# Patient Record
Sex: MALE | Race: WHITE | NOT HISPANIC OR LATINO | URBAN - METROPOLITAN AREA
[De-identification: names, ages, dates, MRNs, and addresses within clinical notes are randomized per-mention and may not be internally consistent; named-entity substitution may affect disease eponyms.]

---

## 2023-03-18 LAB
CALCIDIOL (25 OH VITAMIN D3) (NG/ML) IN SER/PLAS: 93 NG/ML
THYROTROPIN (MIU/L) IN SER/PLAS BY DETECTION LIMIT <= 0.05 MIU/L: 1.27 MIU/L (ref 0.44–3.98)
THYROXINE (T4) (UG/DL) IN SER/PLAS: 8.5 UG/DL (ref 4.5–11.1)
TRIIODOTHYRONINE (T3) (NG/DL) IN SER/PLAS: 80 NG/DL (ref 60–200)

## 2023-03-21 LAB
THYROGLOBULIN AB (IU/ML) IN SER/PLAS: <0.9 IU/ML (ref 0–4)
THYROGLOBULIN LC-MS/MS: ABNORMAL NG/ML (ref 1.3–31.8)
THYROGLOBULIN: <0.1 NG/ML (ref 1.3–31.8)

## 2023-04-05 ENCOUNTER — OFFICE (OUTPATIENT)
Dept: URBAN - METROPOLITAN AREA CLINIC 26 | Facility: CLINIC | Age: 88
End: 2023-04-05
Payer: COMMERCIAL

## 2023-04-05 VITALS
DIASTOLIC BLOOD PRESSURE: 76 MMHG | HEIGHT: 70 IN | HEART RATE: 69 BPM | SYSTOLIC BLOOD PRESSURE: 126 MMHG | WEIGHT: 185 LBS | TEMPERATURE: 98.1 F

## 2023-04-05 DIAGNOSIS — K62.5 HEMORRHAGE OF ANUS AND RECTUM: ICD-10-CM

## 2023-04-05 DIAGNOSIS — K21.9 GASTRO-ESOPHAGEAL REFLUX DISEASE WITHOUT ESOPHAGITIS: ICD-10-CM

## 2023-04-05 DIAGNOSIS — K57.90 DIVERTICULOSIS OF INTESTINE, PART UNSPECIFIED, WITHOUT PERFO: ICD-10-CM

## 2023-04-05 PROCEDURE — 99213 OFFICE O/P EST LOW 20 MIN: CPT | Performed by: NURSE PRACTITIONER

## 2023-04-05 RX ORDER — PANTOPRAZOLE 40 MG/1
TABLET, DELAYED RELEASE ORAL
Qty: 30 | Refills: 1 | Status: ACTIVE

## 2023-04-06 LAB
ALANINE AMINOTRANSFERASE (SGPT) (U/L) IN SER/PLAS: 21 U/L (ref 10–52)
ALBUMIN (G/DL) IN SER/PLAS: 4 G/DL (ref 3.4–5)
ALKALINE PHOSPHATASE (U/L) IN SER/PLAS: 63 U/L (ref 33–136)
ANION GAP IN SER/PLAS: 12 MMOL/L (ref 10–20)
ASPARTATE AMINOTRANSFERASE (SGOT) (U/L) IN SER/PLAS: 26 U/L (ref 9–39)
BASOPHILS (10*3/UL) IN BLOOD BY AUTOMATED COUNT: 0.04 X10E9/L (ref 0–0.1)
BASOPHILS/100 LEUKOCYTES IN BLOOD BY AUTOMATED COUNT: 0.9 % (ref 0–2)
BILIRUBIN TOTAL (MG/DL) IN SER/PLAS: 0.5 MG/DL (ref 0–1.2)
CALCIUM (MG/DL) IN SER/PLAS: 8.7 MG/DL (ref 8.6–10.3)
CARBON DIOXIDE, TOTAL (MMOL/L) IN SER/PLAS: 27 MMOL/L (ref 21–32)
CHLORIDE (MMOL/L) IN SER/PLAS: 106 MMOL/L (ref 98–107)
COBALAMIN (VITAMIN B12) (PG/ML) IN SER/PLAS: 265 PG/ML (ref 211–911)
CREATININE (MG/DL) IN SER/PLAS: 1.17 MG/DL (ref 0.5–1.3)
EOSINOPHILS (10*3/UL) IN BLOOD BY AUTOMATED COUNT: 0.12 X10E9/L (ref 0–0.4)
EOSINOPHILS/100 LEUKOCYTES IN BLOOD BY AUTOMATED COUNT: 2.7 % (ref 0–6)
ERYTHROCYTE DISTRIBUTION WIDTH (RATIO) BY AUTOMATED COUNT: 12.5 % (ref 11.5–14.5)
ERYTHROCYTE MEAN CORPUSCULAR HEMOGLOBIN CONCENTRATION (G/DL) BY AUTOMATED: 32.3 G/DL (ref 32–36)
ERYTHROCYTE MEAN CORPUSCULAR VOLUME (FL) BY AUTOMATED COUNT: 104 FL (ref 80–100)
ERYTHROCYTES (10*6/UL) IN BLOOD BY AUTOMATED COUNT: 3.85 X10E12/L (ref 4.5–5.9)
FERRITIN (UG/LL) IN SER/PLAS: 203 UG/L (ref 20–300)
FOLATE (NG/ML) IN SER/PLAS: 21 NG/ML
GFR MALE: 60 ML/MIN/1.73M2
GLUCOSE (MG/DL) IN SER/PLAS: 154 MG/DL (ref 74–99)
HEMATOCRIT (%) IN BLOOD BY AUTOMATED COUNT: 40.2 % (ref 41–52)
HEMOGLOBIN (G/DL) IN BLOOD: 13 G/DL (ref 13.5–17.5)
IMMATURE GRANULOCYTES/100 LEUKOCYTES IN BLOOD BY AUTOMATED COUNT: 0.9 % (ref 0–0.9)
IRON (UG/DL) IN SER/PLAS: 103 UG/DL (ref 35–150)
IRON BINDING CAPACITY (UG/DL) IN SER/PLAS: 267 UG/DL (ref 240–445)
IRON SATURATION (%) IN SER/PLAS: 39 % (ref 25–45)
LEUKOCYTES (10*3/UL) IN BLOOD BY AUTOMATED COUNT: 4.4 X10E9/L (ref 4.4–11.3)
LYMPHOCYTES (10*3/UL) IN BLOOD BY AUTOMATED COUNT: 1.02 X10E9/L (ref 0.8–3)
LYMPHOCYTES/100 LEUKOCYTES IN BLOOD BY AUTOMATED COUNT: 23.3 % (ref 13–44)
MONOCYTES (10*3/UL) IN BLOOD BY AUTOMATED COUNT: 0.33 X10E9/L (ref 0.05–0.8)
MONOCYTES/100 LEUKOCYTES IN BLOOD BY AUTOMATED COUNT: 7.5 % (ref 2–10)
NEUTROPHILS (10*3/UL) IN BLOOD BY AUTOMATED COUNT: 2.83 X10E9/L (ref 1.6–5.5)
NEUTROPHILS/100 LEUKOCYTES IN BLOOD BY AUTOMATED COUNT: 64.7 % (ref 40–80)
NRBC (PER 100 WBCS) BY AUTOMATED COUNT: 0 /100 WBC (ref 0–0)
PLATELETS (10*3/UL) IN BLOOD AUTOMATED COUNT: 230 X10E9/L (ref 150–450)
POTASSIUM (MMOL/L) IN SER/PLAS: 3.8 MMOL/L (ref 3.5–5.3)
PROTEIN TOTAL: 6.7 G/DL (ref 6.4–8.2)
SODIUM (MMOL/L) IN SER/PLAS: 141 MMOL/L (ref 136–145)
UREA NITROGEN (MG/DL) IN SER/PLAS: 17 MG/DL (ref 6–23)

## 2023-04-12 LAB
THYROTROPIN (MIU/L) IN SER/PLAS BY DETECTION LIMIT <= 0.05 MIU/L: 0.33 MIU/L (ref 0.44–3.98)
THYROXINE (T4) (UG/DL) IN SER/PLAS: 10.7 UG/DL (ref 4.5–11.1)
TRIIODOTHYRONINE (T3) (NG/DL) IN SER/PLAS: 92 NG/DL (ref 60–200)

## 2023-05-09 ENCOUNTER — OFFICE (OUTPATIENT)
Dept: URBAN - METROPOLITAN AREA CLINIC 26 | Facility: CLINIC | Age: 88
End: 2023-05-09

## 2023-05-09 VITALS
SYSTOLIC BLOOD PRESSURE: 116 MMHG | WEIGHT: 185 LBS | HEIGHT: 70 IN | TEMPERATURE: 97.9 F | DIASTOLIC BLOOD PRESSURE: 65 MMHG | HEART RATE: 68 BPM

## 2023-05-09 DIAGNOSIS — D64.9 ANEMIA, UNSPECIFIED: ICD-10-CM

## 2023-05-09 DIAGNOSIS — K21.9 GASTRO-ESOPHAGEAL REFLUX DISEASE WITHOUT ESOPHAGITIS: ICD-10-CM

## 2023-05-09 DIAGNOSIS — K57.90 DIVERTICULOSIS OF INTESTINE, PART UNSPECIFIED, WITHOUT PERFO: ICD-10-CM

## 2023-05-09 PROCEDURE — 99213 OFFICE O/P EST LOW 20 MIN: CPT | Performed by: NURSE PRACTITIONER

## 2023-07-12 LAB
THYROTROPIN (MIU/L) IN SER/PLAS BY DETECTION LIMIT <= 0.05 MIU/L: 0.24 MIU/L (ref 0.44–3.98)
THYROXINE (T4) (UG/DL) IN SER/PLAS: 8.8 UG/DL (ref 4.5–11.1)
TRIIODOTHYRONINE (T3) (NG/DL) IN SER/PLAS: 99 NG/DL (ref 60–200)

## 2023-07-19 LAB — THYROGLOBULIN LC-MS/MS: <0.5 NG/ML (ref 1.3–31.8)

## 2023-08-03 ENCOUNTER — OFFICE (OUTPATIENT)
Dept: URBAN - METROPOLITAN AREA CLINIC 26 | Facility: CLINIC | Age: 88
End: 2023-08-03
Payer: COMMERCIAL

## 2023-08-03 VITALS
SYSTOLIC BLOOD PRESSURE: 135 MMHG | HEART RATE: 71 BPM | TEMPERATURE: 97.8 F | DIASTOLIC BLOOD PRESSURE: 77 MMHG | HEIGHT: 70 IN | WEIGHT: 184 LBS

## 2023-08-03 DIAGNOSIS — K21.9 GASTRO-ESOPHAGEAL REFLUX DISEASE WITHOUT ESOPHAGITIS: ICD-10-CM

## 2023-08-03 DIAGNOSIS — K62.5 HEMORRHAGE OF ANUS AND RECTUM: ICD-10-CM

## 2023-08-03 PROCEDURE — 99213 OFFICE O/P EST LOW 20 MIN: CPT | Performed by: INTERNAL MEDICINE

## 2023-11-03 ENCOUNTER — LAB (OUTPATIENT)
Dept: LAB | Facility: LAB | Age: 88
End: 2023-11-03
Payer: MEDICARE

## 2023-11-03 DIAGNOSIS — E78.5 HYPERLIPIDEMIA, UNSPECIFIED: Primary | ICD-10-CM

## 2023-11-03 DIAGNOSIS — R73.9 HYPERGLYCEMIA, UNSPECIFIED: ICD-10-CM

## 2023-11-03 DIAGNOSIS — E55.9 VITAMIN D DEFICIENCY, UNSPECIFIED: ICD-10-CM

## 2023-11-03 DIAGNOSIS — E03.9 HYPOTHYROIDISM, UNSPECIFIED: ICD-10-CM

## 2023-11-03 LAB
25(OH)D3 SERPL-MCNC: 107 NG/ML (ref 30–100)
ANION GAP SERPL CALC-SCNC: 10 MMOL/L (ref 10–20)
BUN SERPL-MCNC: 13 MG/DL (ref 6–23)
CALCIUM SERPL-MCNC: 8.9 MG/DL (ref 8.6–10.3)
CHLORIDE SERPL-SCNC: 103 MMOL/L (ref 98–107)
CHOLEST SERPL-MCNC: 156 MG/DL (ref 0–199)
CHOLESTEROL/HDL RATIO: 3.8
CO2 SERPL-SCNC: 28 MMOL/L (ref 21–32)
CREAT SERPL-MCNC: 1.07 MG/DL (ref 0.5–1.3)
GFR SERPL CREATININE-BSD FRML MDRD: 67 ML/MIN/1.73M*2
GLUCOSE SERPL-MCNC: 92 MG/DL (ref 74–99)
HDLC SERPL-MCNC: 41.2 MG/DL
LDLC SERPL CALC-MCNC: 89 MG/DL
NON HDL CHOLESTEROL: 115 MG/DL (ref 0–149)
POTASSIUM SERPL-SCNC: 4.3 MMOL/L (ref 3.5–5.3)
SODIUM SERPL-SCNC: 137 MMOL/L (ref 136–145)
T3FREE SERPL-MCNC: 3 PG/ML (ref 2.3–4.2)
T4 FREE SERPL-MCNC: 1.16 NG/DL (ref 0.61–1.12)
TRIGL SERPL-MCNC: 131 MG/DL (ref 0–149)
TSH SERPL-ACNC: 0.33 MIU/L (ref 0.44–3.98)
VLDL: 26 MG/DL (ref 0–40)

## 2023-11-03 PROCEDURE — 80061 LIPID PANEL: CPT

## 2023-11-03 PROCEDURE — 84439 ASSAY OF FREE THYROXINE: CPT

## 2023-11-03 PROCEDURE — 84443 ASSAY THYROID STIM HORMONE: CPT

## 2023-11-03 PROCEDURE — 80048 BASIC METABOLIC PNL TOTAL CA: CPT

## 2023-11-03 PROCEDURE — 84481 FREE ASSAY (FT-3): CPT

## 2023-11-03 PROCEDURE — 82306 VITAMIN D 25 HYDROXY: CPT

## 2023-11-03 PROCEDURE — 36415 COLL VENOUS BLD VENIPUNCTURE: CPT

## 2023-12-09 ENCOUNTER — APPOINTMENT (OUTPATIENT)
Dept: RADIOLOGY | Facility: HOSPITAL | Age: 88
DRG: 087 | End: 2023-12-09
Payer: MEDICARE

## 2023-12-09 ENCOUNTER — HOSPITAL ENCOUNTER (INPATIENT)
Facility: HOSPITAL | Age: 88
LOS: 1 days | Discharge: HOME | DRG: 087 | End: 2023-12-10
Attending: STUDENT IN AN ORGANIZED HEALTH CARE EDUCATION/TRAINING PROGRAM | Admitting: SURGERY
Payer: MEDICARE
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
## 2023-12-09 DIAGNOSIS — S06.6X0A SUBARACHNOID HEMORRHAGE FOLLOWING INJURY, NO LOSS OF CONSCIOUSNESS, INITIAL ENCOUNTER (MULTI): ICD-10-CM

## 2023-12-09 PROBLEM — C73 THYROID CANCER (MULTI): Status: RESOLVED | Noted: 2023-12-09 | Resolved: 2023-12-09

## 2023-12-09 PROBLEM — E78.00 HYPERCHOLESTEREMIA: Status: ACTIVE | Noted: 2023-12-09

## 2023-12-09 PROBLEM — M54.12 CERVICAL RADICULITIS: Status: RESOLVED | Noted: 2023-12-09 | Resolved: 2023-12-09

## 2023-12-09 PROBLEM — B02.9 SHINGLES: Status: RESOLVED | Noted: 2023-12-09 | Resolved: 2023-12-09

## 2023-12-09 PROBLEM — M54.2 CHRONIC NECK PAIN: Status: RESOLVED | Noted: 2023-12-09 | Resolved: 2023-12-09

## 2023-12-09 PROBLEM — G89.29 CHRONIC NECK PAIN: Status: RESOLVED | Noted: 2023-12-09 | Resolved: 2023-12-09

## 2023-12-09 LAB
ABO GROUP (TYPE) IN BLOOD: NORMAL
ALBUMIN SERPL BCP-MCNC: 3.5 G/DL (ref 3.4–5)
ALP SERPL-CCNC: 65 U/L (ref 33–136)
ALT SERPL W P-5'-P-CCNC: 18 U/L (ref 10–52)
ANION GAP SERPL CALC-SCNC: 12 MMOL/L (ref 10–20)
ANTIBODY SCREEN: NORMAL
APTT PPP: 30 SECONDS (ref 27–38)
AST SERPL W P-5'-P-CCNC: 22 U/L (ref 9–39)
BASOPHILS # BLD AUTO: 0.03 X10*3/UL (ref 0–0.1)
BASOPHILS NFR BLD AUTO: 0.4 %
BILIRUB SERPL-MCNC: 0.5 MG/DL (ref 0–1.2)
BUN SERPL-MCNC: 17 MG/DL (ref 6–23)
CALCIUM SERPL-MCNC: 8.9 MG/DL (ref 8.6–10.3)
CHLORIDE SERPL-SCNC: 106 MMOL/L (ref 98–107)
CO2 SERPL-SCNC: 25 MMOL/L (ref 21–32)
CREAT SERPL-MCNC: 1.12 MG/DL (ref 0.5–1.3)
EOSINOPHIL # BLD AUTO: 0.1 X10*3/UL (ref 0–0.4)
EOSINOPHIL NFR BLD AUTO: 1.4 %
ERYTHROCYTE [DISTWIDTH] IN BLOOD BY AUTOMATED COUNT: 12 % (ref 11.5–14.5)
GFR SERPL CREATININE-BSD FRML MDRD: 64 ML/MIN/1.73M*2
GLUCOSE SERPL-MCNC: 94 MG/DL (ref 74–99)
HCT VFR BLD AUTO: 41.1 % (ref 41–52)
HGB BLD-MCNC: 13.8 G/DL (ref 13.5–17.5)
IMM GRANULOCYTES # BLD AUTO: 0.06 X10*3/UL (ref 0–0.5)
IMM GRANULOCYTES NFR BLD AUTO: 0.8 % (ref 0–0.9)
INR PPP: 1.1 (ref 0.9–1.1)
LYMPHOCYTES # BLD AUTO: 0.6 X10*3/UL (ref 0.8–3)
LYMPHOCYTES NFR BLD AUTO: 8.5 %
MCH RBC QN AUTO: 33.7 PG (ref 26–34)
MCHC RBC AUTO-ENTMCNC: 33.6 G/DL (ref 32–36)
MCV RBC AUTO: 101 FL (ref 80–100)
MONOCYTES # BLD AUTO: 0.51 X10*3/UL (ref 0.05–0.8)
MONOCYTES NFR BLD AUTO: 7.2 %
NEUTROPHILS # BLD AUTO: 5.76 X10*3/UL (ref 1.6–5.5)
NEUTROPHILS NFR BLD AUTO: 81.7 %
NRBC BLD-RTO: 0 /100 WBCS (ref 0–0)
PLATELET # BLD AUTO: 192 X10*3/UL (ref 150–450)
POTASSIUM SERPL-SCNC: 4.3 MMOL/L (ref 3.5–5.3)
PROT SERPL-MCNC: 5.9 G/DL (ref 6.4–8.2)
PROTHROMBIN TIME: 12 SECONDS (ref 9.8–12.8)
RBC # BLD AUTO: 4.09 X10*6/UL (ref 4.5–5.9)
RH FACTOR (ANTIGEN D): NORMAL
SODIUM SERPL-SCNC: 139 MMOL/L (ref 136–145)
WBC # BLD AUTO: 7.1 X10*3/UL (ref 4.4–11.3)

## 2023-12-09 PROCEDURE — 73130 X-RAY EXAM OF HAND: CPT | Mod: RT

## 2023-12-09 PROCEDURE — 99223 1ST HOSP IP/OBS HIGH 75: CPT

## 2023-12-09 PROCEDURE — 70450 CT HEAD/BRAIN W/O DYE: CPT | Performed by: RADIOLOGY

## 2023-12-09 PROCEDURE — 72125 CT NECK SPINE W/O DYE: CPT | Performed by: RADIOLOGY

## 2023-12-09 PROCEDURE — 86850 RBC ANTIBODY SCREEN: CPT | Performed by: STUDENT IN AN ORGANIZED HEALTH CARE EDUCATION/TRAINING PROGRAM

## 2023-12-09 PROCEDURE — 71045 X-RAY EXAM CHEST 1 VIEW: CPT | Mod: FY

## 2023-12-09 PROCEDURE — 70450 CT HEAD/BRAIN W/O DYE: CPT

## 2023-12-09 PROCEDURE — G0378 HOSPITAL OBSERVATION PER HR: HCPCS

## 2023-12-09 PROCEDURE — 80053 COMPREHEN METABOLIC PANEL: CPT | Performed by: STUDENT IN AN ORGANIZED HEALTH CARE EDUCATION/TRAINING PROGRAM

## 2023-12-09 PROCEDURE — 73130 X-RAY EXAM OF HAND: CPT | Mod: RIGHT SIDE | Performed by: STUDENT IN AN ORGANIZED HEALTH CARE EDUCATION/TRAINING PROGRAM

## 2023-12-09 PROCEDURE — 73110 X-RAY EXAM OF WRIST: CPT | Mod: RT

## 2023-12-09 PROCEDURE — G0390 TRAUMA RESPONS W/HOSP CRITI: HCPCS

## 2023-12-09 PROCEDURE — 94760 N-INVAS EAR/PLS OXIMETRY 1: CPT

## 2023-12-09 PROCEDURE — 36415 COLL VENOUS BLD VENIPUNCTURE: CPT | Performed by: STUDENT IN AN ORGANIZED HEALTH CARE EDUCATION/TRAINING PROGRAM

## 2023-12-09 PROCEDURE — 73110 X-RAY EXAM OF WRIST: CPT | Mod: RIGHT SIDE | Performed by: STUDENT IN AN ORGANIZED HEALTH CARE EDUCATION/TRAINING PROGRAM

## 2023-12-09 PROCEDURE — 85610 PROTHROMBIN TIME: CPT | Performed by: STUDENT IN AN ORGANIZED HEALTH CARE EDUCATION/TRAINING PROGRAM

## 2023-12-09 PROCEDURE — 90715 TDAP VACCINE 7 YRS/> IM: CPT | Performed by: STUDENT IN AN ORGANIZED HEALTH CARE EDUCATION/TRAINING PROGRAM

## 2023-12-09 PROCEDURE — 85025 COMPLETE CBC W/AUTO DIFF WBC: CPT | Performed by: STUDENT IN AN ORGANIZED HEALTH CARE EDUCATION/TRAINING PROGRAM

## 2023-12-09 PROCEDURE — 71045 X-RAY EXAM CHEST 1 VIEW: CPT | Performed by: RADIOLOGY

## 2023-12-09 PROCEDURE — 99285 EMERGENCY DEPT VISIT HI MDM: CPT | Performed by: STUDENT IN AN ORGANIZED HEALTH CARE EDUCATION/TRAINING PROGRAM

## 2023-12-09 PROCEDURE — 2500000004 HC RX 250 GENERAL PHARMACY W/ HCPCS (ALT 636 FOR OP/ED)

## 2023-12-09 PROCEDURE — 90471 IMMUNIZATION ADMIN: CPT | Performed by: STUDENT IN AN ORGANIZED HEALTH CARE EDUCATION/TRAINING PROGRAM

## 2023-12-09 PROCEDURE — 2500000004 HC RX 250 GENERAL PHARMACY W/ HCPCS (ALT 636 FOR OP/ED): Performed by: STUDENT IN AN ORGANIZED HEALTH CARE EDUCATION/TRAINING PROGRAM

## 2023-12-09 PROCEDURE — 72170 X-RAY EXAM OF PELVIS: CPT

## 2023-12-09 PROCEDURE — 86901 BLOOD TYPING SEROLOGIC RH(D): CPT | Performed by: STUDENT IN AN ORGANIZED HEALTH CARE EDUCATION/TRAINING PROGRAM

## 2023-12-09 PROCEDURE — 72170 X-RAY EXAM OF PELVIS: CPT | Performed by: RADIOLOGY

## 2023-12-09 PROCEDURE — 72125 CT NECK SPINE W/O DYE: CPT

## 2023-12-09 PROCEDURE — 2500000001 HC RX 250 WO HCPCS SELF ADMINISTERED DRUGS (ALT 637 FOR MEDICARE OP): Performed by: STUDENT IN AN ORGANIZED HEALTH CARE EDUCATION/TRAINING PROGRAM

## 2023-12-09 RX ORDER — EZETIMIBE 10 MG/1
10 TABLET ORAL DAILY
COMMUNITY

## 2023-12-09 RX ORDER — LEVOTHYROXINE SODIUM 150 UG/1
150 TABLET ORAL
Status: DISCONTINUED | OUTPATIENT
Start: 2023-12-11 | End: 2023-12-10 | Stop reason: HOSPADM

## 2023-12-09 RX ORDER — LEVETIRACETAM 500 MG/1
500 TABLET ORAL EVERY 12 HOURS
Status: DISCONTINUED | OUTPATIENT
Start: 2023-12-09 | End: 2023-12-10 | Stop reason: HOSPADM

## 2023-12-09 RX ORDER — SODIUM CHLORIDE 9 MG/ML
100 INJECTION, SOLUTION INTRAVENOUS CONTINUOUS
Status: DISCONTINUED | OUTPATIENT
Start: 2023-12-09 | End: 2023-12-10 | Stop reason: HOSPADM

## 2023-12-09 RX ORDER — ASCORBIC ACID 500 MG
500 TABLET ORAL DAILY
COMMUNITY

## 2023-12-09 RX ORDER — CHOLECALCIFEROL (VITAMIN D3) 50 MCG
2000 TABLET ORAL DAILY
COMMUNITY
Start: 2018-02-14

## 2023-12-09 RX ORDER — LEVETIRACETAM 500 MG/1
500 TABLET ORAL ONCE
Status: COMPLETED | OUTPATIENT
Start: 2023-12-09 | End: 2023-12-09

## 2023-12-09 RX ORDER — EZETIMIBE 10 MG/1
10 TABLET ORAL DAILY
Status: DISCONTINUED | OUTPATIENT
Start: 2023-12-10 | End: 2023-12-10 | Stop reason: HOSPADM

## 2023-12-09 RX ORDER — LABETALOL HYDROCHLORIDE 5 MG/ML
10 INJECTION, SOLUTION INTRAVENOUS EVERY 6 HOURS PRN
Status: DISCONTINUED | OUTPATIENT
Start: 2023-12-09 | End: 2023-12-10 | Stop reason: HOSPADM

## 2023-12-09 RX ORDER — LEVOTHYROXINE SODIUM 75 UG/1
225 TABLET ORAL
Status: DISCONTINUED | OUTPATIENT
Start: 2023-12-10 | End: 2023-12-10 | Stop reason: HOSPADM

## 2023-12-09 RX ORDER — LEVETIRACETAM 5 MG/ML
500 INJECTION INTRAVASCULAR EVERY 12 HOURS
Status: DISCONTINUED | OUTPATIENT
Start: 2023-12-09 | End: 2023-12-09

## 2023-12-09 RX ORDER — FINASTERIDE 5 MG/1
5 TABLET, FILM COATED ORAL DAILY
Status: DISCONTINUED | OUTPATIENT
Start: 2023-12-10 | End: 2023-12-10 | Stop reason: HOSPADM

## 2023-12-09 RX ORDER — LEVOTHYROXINE SODIUM 150 UG/1
1 TABLET ORAL
COMMUNITY
Start: 2017-08-30

## 2023-12-09 RX ORDER — GLUCOSAM/CHONDRO/HERB 149/HYAL 750-100 MG
1 TABLET ORAL EVERY OTHER DAY
COMMUNITY

## 2023-12-09 RX ORDER — PANTOPRAZOLE SODIUM 40 MG/1
40 TABLET, DELAYED RELEASE ORAL DAILY
Status: DISCONTINUED | OUTPATIENT
Start: 2023-12-10 | End: 2023-12-10 | Stop reason: HOSPADM

## 2023-12-09 RX ORDER — MULTIVITAMIN
1 TABLET ORAL NIGHTLY
COMMUNITY

## 2023-12-09 RX ORDER — MULTIVITAMIN
1 TABLET ORAL DAILY
COMMUNITY

## 2023-12-09 RX ORDER — FINASTERIDE 5 MG/1
5 TABLET, FILM COATED ORAL DAILY
COMMUNITY
Start: 2023-09-07

## 2023-12-09 RX ORDER — HYDRALAZINE HYDROCHLORIDE 20 MG/ML
10 INJECTION INTRAMUSCULAR; INTRAVENOUS
Status: DISCONTINUED | OUTPATIENT
Start: 2023-12-09 | End: 2023-12-10 | Stop reason: HOSPADM

## 2023-12-09 RX ORDER — ACETAMINOPHEN 160 MG/5ML
650 SOLUTION ORAL EVERY 4 HOURS PRN
Status: DISCONTINUED | OUTPATIENT
Start: 2023-12-09 | End: 2023-12-09

## 2023-12-09 RX ORDER — ASPIRIN 81 MG/1
81 TABLET ORAL NIGHTLY
COMMUNITY
End: 2023-12-10 | Stop reason: HOSPADM

## 2023-12-09 RX ORDER — ACETAMINOPHEN 325 MG/1
650 TABLET ORAL EVERY 4 HOURS PRN
Status: DISCONTINUED | OUTPATIENT
Start: 2023-12-09 | End: 2023-12-10 | Stop reason: HOSPADM

## 2023-12-09 RX ORDER — PANTOPRAZOLE SODIUM 40 MG/1
40 TABLET, DELAYED RELEASE ORAL
COMMUNITY

## 2023-12-09 RX ORDER — OXYCODONE HYDROCHLORIDE 5 MG/1
5 TABLET ORAL EVERY 4 HOURS PRN
Status: DISCONTINUED | OUTPATIENT
Start: 2023-12-09 | End: 2023-12-10 | Stop reason: HOSPADM

## 2023-12-09 RX ADMIN — TETANUS TOXOID, REDUCED DIPHTHERIA TOXOID AND ACELLULAR PERTUSSIS VACCINE, ADSORBED 0.5 ML: 5; 2.5; 8; 8; 2.5 SUSPENSION INTRAMUSCULAR at 14:47

## 2023-12-09 RX ADMIN — LEVETIRACETAM 500 MG: 500 TABLET, FILM COATED ORAL at 14:48

## 2023-12-09 RX ADMIN — ACETAMINOPHEN 650 MG: 325 TABLET ORAL at 18:11

## 2023-12-09 RX ADMIN — SODIUM CHLORIDE 75 ML/HR: 9 INJECTION, SOLUTION INTRAVENOUS at 18:12

## 2023-12-09 ASSESSMENT — PAIN SCALES - GENERAL
PAINLEVEL_OUTOF10: 0 - NO PAIN
PAINLEVEL_OUTOF10: 2
PAINLEVEL_OUTOF10: 5 - MODERATE PAIN
PAINLEVEL_OUTOF10: 5 - MODERATE PAIN

## 2023-12-09 ASSESSMENT — COGNITIVE AND FUNCTIONAL STATUS - GENERAL
MOVING TO AND FROM BED TO CHAIR: A LOT
DRESSING REGULAR LOWER BODY CLOTHING: A LITTLE
TURNING FROM BACK TO SIDE WHILE IN FLAT BAD: A LITTLE
DRESSING REGULAR UPPER BODY CLOTHING: A LITTLE
TOILETING: A LITTLE
STANDING UP FROM CHAIR USING ARMS: A LOT
MOVING FROM LYING ON BACK TO SITTING ON SIDE OF FLAT BED WITH BEDRAILS: A LITTLE
CLIMB 3 TO 5 STEPS WITH RAILING: A LOT
EATING MEALS: A LITTLE
PERSONAL GROOMING: A LITTLE
WALKING IN HOSPITAL ROOM: A LOT
MOBILITY SCORE: 14
HELP NEEDED FOR BATHING: A LITTLE
DAILY ACTIVITIY SCORE: 18

## 2023-12-09 ASSESSMENT — ACTIVITIES OF DAILY LIVING (ADL)
PATIENT'S MEMORY ADEQUATE TO SAFELY COMPLETE DAILY ACTIVITIES?: YES
FEEDING YOURSELF: NEEDS ASSISTANCE
BATHING: NEEDS ASSISTANCE
DRESSING YOURSELF: NEEDS ASSISTANCE
ASSISTIVE_DEVICE: EYEGLASSES
JUDGMENT_ADEQUATE_SAFELY_COMPLETE_DAILY_ACTIVITIES: YES
GROOMING: NEEDS ASSISTANCE
WALKS IN HOME: NEEDS ASSISTANCE
ADEQUATE_TO_COMPLETE_ADL: YES
HEARING - LEFT EAR: FUNCTIONAL
HEARING - RIGHT EAR: FUNCTIONAL
TOILETING: NEEDS ASSISTANCE

## 2023-12-09 ASSESSMENT — COLUMBIA-SUICIDE SEVERITY RATING SCALE - C-SSRS
6. HAVE YOU EVER DONE ANYTHING, STARTED TO DO ANYTHING, OR PREPARED TO DO ANYTHING TO END YOUR LIFE?: NO
2. HAVE YOU ACTUALLY HAD ANY THOUGHTS OF KILLING YOURSELF?: NO
1. IN THE PAST MONTH, HAVE YOU WISHED YOU WERE DEAD OR WISHED YOU COULD GO TO SLEEP AND NOT WAKE UP?: NO

## 2023-12-09 ASSESSMENT — PAIN DESCRIPTION - ORIENTATION: ORIENTATION: RIGHT

## 2023-12-09 ASSESSMENT — LIFESTYLE VARIABLES
EVER HAD A DRINK FIRST THING IN THE MORNING TO STEADY YOUR NERVES TO GET RID OF A HANGOVER: NO
HAVE YOU EVER FELT YOU SHOULD CUT DOWN ON YOUR DRINKING: NO
REASON UNABLE TO ASSESS: NO
EVER FELT BAD OR GUILTY ABOUT YOUR DRINKING: NO
HAVE PEOPLE ANNOYED YOU BY CRITICIZING YOUR DRINKING: NO

## 2023-12-09 ASSESSMENT — PAIN DESCRIPTION - LOCATION
LOCATION: SHOULDER
LOCATION: HEAD

## 2023-12-09 ASSESSMENT — PAIN - FUNCTIONAL ASSESSMENT
PAIN_FUNCTIONAL_ASSESSMENT: 0-10

## 2023-12-09 NOTE — H&P
Trauma History and Physical        Subjective   Patient is a 88 y.o. male admitted on 12/9/2023  1:30 PM  Arrival Date: 12/09/23  Activation Time: 1321  Trauma Activation Level: limited  Date of injury: today  Time of injury: immediately prior to arrival    HPI:  Pt reports he was helping set up for a [employer] party, standing on a counter a few feet off the ground when he lost his balance and fell. +Head strike, -LOC, witnessed fall. Pt reported was a bit disoriented at the scene per EMS, A&Ox2 only, but pt denies this. He denies HA, visual changes, dizziness/lightheadedness, weakness, paresthesias, or N/V. He endorses some mild Rt chest wall pain with movement that started after he arrived to the ED, but he states it just feels like a sore muscle and is not worse with breathing. He denies SOB or chest pain. Pt reports he takes a baby ASA daily.    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    Acquired cyst of kidney 02/13/2008    Benign localized hyperplasia of prostate 12/31/2003    Benign prostatic hyperplasia without lower urinary tract symptoms     Prostate enlargement    BPH with obstruction/lower urinary tract symptoms 08/22/2007    Cervical radiculitis 12/09/2023    Chronic neck pain 12/09/2023    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 s/p thyroidectomy 2009    Urinary frequency 12/31/2003     Past Surgical History:   Procedure Laterality Date    BLADDER SURGERY  12/19/2017    Bladder Surgery    OTHER SURGICAL HISTORY  12/19/2017    History Of Prior Surgery    TOTAL THYROIDECTOMY  12/19/2017    Thyroid Surgery Total Thyroidectomy     (Not in a hospital admission)    Patient has no known allergies.     No family history on file.    Objective   Vitals:  Most Recent:  Vitals:    12/09/23 1500   BP: 121/67   Pulse: 61   Resp: 15   Temp:    SpO2: 95%       24hr Min/Max:  Temp  Min: 36.4 °C (97.5 °F)  Max: 36.4 °C (97.5 °F)  Pulse  Min: 61  Max: 77  BP  Min: 118/63  Max: 158/75  Resp  Min: 15  Max: 18  SpO2  Min: 93 %  Max: 96 %    Hemodynamic parameters for last 24 hours:       No intake/output data recorded.      Physical exam:    Physical Exam  Constitutional:       General: He is not in acute distress.     Appearance: Normal appearance. He is not ill-appearing.   HENT:      Head: Laceration (Small superficial Rt frontal abrasion w/ associated hematoma, slight oozing) present. No raccoon eyes or Mohan's sign.      Jaw: There is normal jaw occlusion. No tenderness.      Right Ear: External ear normal.      Left Ear: External ear normal.      Nose: No nasal deformity, septal deviation or nasal tenderness.      Right Nostril: No epistaxis or septal hematoma.      Left Nostril: No epistaxis or septal hematoma.      Mouth/Throat:      Lips: Pink.      Mouth: Mucous membranes are moist. No injury.      Dentition: Normal dentition.   Eyes:      Extraocular Movements: Extraocular movements intact.      Conjunctiva/sclera: Conjunctivae normal.      Pupils: Pupils are equal, round, and reactive to light.      Comments: No periorbital hematoma   Neck:      Trachea: No tracheal  deviation.   Cardiovascular:      Rate and Rhythm: Normal rate and regular rhythm.      Pulses: Normal pulses.      Heart sounds: Normal heart sounds. No murmur heard.  Pulmonary:      Effort: Pulmonary effort is normal. No respiratory distress.      Breath sounds: Normal breath sounds and air entry.   Chest:      Comments: Mildly TTP Rt upper anterior chest. No hematoma, ecchymosis, abrasion, or crepitus  Abdominal:      General: Abdomen is flat. Bowel sounds are normal.      Palpations: Abdomen is soft.      Tenderness: There is no abdominal tenderness.   Genitourinary:     Comments: Pelvis stable to AP and lateral compression  Musculoskeletal:      Cervical back: Normal and normal range of motion. No deformity or bony tenderness. No pain with movement or muscular tenderness.      Thoracic back: Normal. No deformity or bony tenderness.      Lumbar back: Normal. No deformity or bony tenderness.      Comments: No UE or LE deformities, no bony tenderness, no edema or contusions, MAEx4   Skin:     General: Skin is warm and dry.      Coloration: Skin is not pale.      Comments: Superficial skin tears and ecchymoses to Rt hand and wrist, hemostatic   Neurological:      Mental Status: He is alert and oriented to person, place, and time.      GCS: GCS eye subscore is 4. GCS verbal subscore is 5. GCS motor subscore is 6.      Cranial Nerves: Cranial nerves 2-12 are intact.      Sensory: Sensation is intact.      Motor: Motor function is intact.   Psychiatric:         Attention and Perception: Attention normal.         Mood and Affect: Mood and affect normal.         Behavior: Behavior normal.         Lab/Radiology/Diagnostic Review:  Results for orders placed or performed during the hospital encounter of 12/09/23 (from the past 24 hour(s))   CBC and Auto Differential   Result Value Ref Range    WBC 7.1 4.4 - 11.3 x10*3/uL    nRBC 0.0 0.0 - 0.0 /100 WBCs    RBC 4.09 (L) 4.50 - 5.90 x10*6/uL    Hemoglobin 13.8 13.5 - 17.5  g/dL    Hematocrit 41.1 41.0 - 52.0 %     (H) 80 - 100 fL    MCH 33.7 26.0 - 34.0 pg    MCHC 33.6 32.0 - 36.0 g/dL    RDW 12.0 11.5 - 14.5 %    Platelets 192 150 - 450 x10*3/uL    Neutrophils % 81.7 40.0 - 80.0 %    Immature Granulocytes %, Automated 0.8 0.0 - 0.9 %    Lymphocytes % 8.5 13.0 - 44.0 %    Monocytes % 7.2 2.0 - 10.0 %    Eosinophils % 1.4 0.0 - 6.0 %    Basophils % 0.4 0.0 - 2.0 %    Neutrophils Absolute 5.76 (H) 1.60 - 5.50 x10*3/uL    Immature Granulocytes Absolute, Automated 0.06 0.00 - 0.50 x10*3/uL    Lymphocytes Absolute 0.60 (L) 0.80 - 3.00 x10*3/uL    Monocytes Absolute 0.51 0.05 - 0.80 x10*3/uL    Eosinophils Absolute 0.10 0.00 - 0.40 x10*3/uL    Basophils Absolute 0.03 0.00 - 0.10 x10*3/uL   Comprehensive metabolic panel   Result Value Ref Range    Glucose 94 74 - 99 mg/dL    Sodium 139 136 - 145 mmol/L    Potassium 4.3 3.5 - 5.3 mmol/L    Chloride 106 98 - 107 mmol/L    Bicarbonate 25 21 - 32 mmol/L    Anion Gap 12 10 - 20 mmol/L    Urea Nitrogen 17 6 - 23 mg/dL    Creatinine 1.12 0.50 - 1.30 mg/dL    eGFR 64 >60 mL/min/1.73m*2    Calcium 8.9 8.6 - 10.3 mg/dL    Albumin 3.5 3.4 - 5.0 g/dL    Alkaline Phosphatase 65 33 - 136 U/L    Total Protein 5.9 (L) 6.4 - 8.2 g/dL    AST 22 9 - 39 U/L    Bilirubin, Total 0.5 0.0 - 1.2 mg/dL    ALT 18 10 - 52 U/L   Coagulation Screen   Result Value Ref Range    Protime 12.0 9.8 - 12.8 seconds    INR 1.1 0.9 - 1.1    aPTT 30 27 - 38 seconds     XR hand right 3+ views    Result Date: 12/9/2023  Interpreted By:  Faustino Kirk, STUDY: XR WRIST RIGHT 3+ VIEWS; XR HAND RIGHT 3+ VIEWS; ;  12/9/2023 2:26 pm   INDICATION: Signs/Symptoms:fall.   COMPARISON: None.   ACCESSION NUMBER(S): [id number]; [id number]   ORDERING CLINICIAN: GABRIEL COLBY   FINDINGS: No fracture or dislocation. Mild degenerative changes of the wrist. No significant soft tissue swelling. No radiopaque foreign body.       No acute findings of the right hand and wrist.     MACRO: None    Signed by: Faustino Kirk 12/9/2023 2:38 PM Dictation workstation:   [id number]    XR wrist right 3+ views    Result Date: 12/9/2023  Interpreted By:  Faustino Kirk, STUDY: XR WRIST RIGHT 3+ VIEWS; XR HAND RIGHT 3+ VIEWS; ;  12/9/2023 2:26 pm   INDICATION: Signs/Symptoms:fall.   COMPARISON: None.   ACCESSION NUMBER(S): [id number]; [id number]   ORDERING CLINICIAN: GABRIEL COLBY   FINDINGS: No fracture or dislocation. Mild degenerative changes of the wrist. No significant soft tissue swelling. No radiopaque foreign body.       No acute findings of the right hand and wrist.     MACRO: None   Signed by: Faustino Kirk 12/9/2023 2:38 PM Dictation workstation:   [id number]    XR chest 1 view    Result Date: 12/9/2023  Interpreted By:  Soto Oconnell, STUDY: XR CHEST 1 VIEW;  12/9/2023 2:02 pm   INDICATION: Signs/Symptoms:Trauma.   COMPARISON: Prior chest x-rays, most recent from 08/02/2018.   ACCESSION NUMBER(S): [id number]   ORDERING CLINICIAN: GABRIEL COLBY   TECHNIQUE: Single AP portable view of the chest was obtained.   FINDINGS: MEDIASTINUM/ LUNGS/ NEWTON: No cardiomegaly, vascular congestion, or pleural effusion. Scant stable linear scarring at the extreme lung bases. No abnormal opacity in either lung worrisome for tumor or pneumonia. No pneumothorax. No tracheal deviation. No abnormal hilar fullness or gross mass on either side.   BONES: No lytic or blastic destructive bone lesion.   UPPER ABDOMEN: Grossly intact.       Scant linear scarring at the lung bases. Otherwise, negative exam.   Signed by: Soto Oconnell 12/9/2023 2:14 PM Dictation workstation:   [id number]    XR pelvis 1-2 views    Result Date: 12/9/2023  Interpreted By:  Soto Oconnell, STUDY: XR PELVIS 1-2 VIEWS;  12/9/2023 2:02 pm   INDICATION: Signs/Symptoms:fall off ladder.   COMPARISON: None.   ACCESSION NUMBER(S): [id number]   ORDERING CLINICIAN: GABRIEL COLBY   TECHNIQUE: Single AP view pelvis was obtained.   FINDINGS: Osteopenic bones.  Bilateral hip joint spaces are preserved. Mild sclerotic arthritic changes in both SI joints. Mild-to-moderate multilevel distal lumbar spine disc space narrowing with endplate osteophytosis. Multiple left-sided pelvic phleboliths. Distal aorto bi-iliac stent graft in place. No lytic or blastic destructive bone lesion. No acute fracture or dislocation. No opaque soft tissue foreign body. No periosteal reaction or erosion.       Osteopenia and DJD as described.   No acute fracture or dislocation.   If symptoms persist or otherwise indicated, could pursue CT scan or MRI.   Signed by: Soto Oconnell 12/9/2023 2:12 PM Dictation workstation:   [id number]    CT head W O contrast trauma protocol    Result Date: 12/9/2023  Interpreted By:  Soto Oconnell, STUDY: CT HEAD W/O CONTRAST TRAUMA PROTOCOL;  12/9/2023 1:53 pm   INDICATION: Signs/Symptoms:fall off ladder.   COMPARISON: None.   ACCESSION NUMBER(S): [id number]   ORDERING CLINICIAN: GABRIEL COLBY   TECHNIQUE: Routine axial images were obtained from the skull base through the vertex.  Sagittal and coronal reconstruction images were generated. Brain, subdural, and bone windows were reviewed.   FINDINGS: INTRACRANIAL: There is diffuse right-sided mild scalp edema involving right parietal, temporal, and frontal scalp. There is also a small anterolateral right frontal scalp hematoma measuring 23 x 9 mm on axial image 61/82. Mild prominence of ventricles and sulci. There is mild patchy hypodensity throughout the deep periventricular white matter. There is a small focus of acute subarachnoid blood in the high lateral right parietal lobe on axial images 24 through 29/82. This is also well demonstrated on coronal images 61 through 63/122. There is a branching hyperdensity in the posterior mid to lower left cerebellar hemisphere that actually is more likely calcification than blood density. It measures up 78 Hounsfield units of CT density. No other indication of acute intracranial  bleed. No midline shift. No destructive bone lesion. No depressed skull fracture. Skullbase arterial calcifications in the carotid siphons and vertebral arteries.   EXTRACRANIAL: Visualized paranasal sinuses were clear. Visualized mastoid air cells were clear.       Diffuse right-sided scalp edema as described. Small anterolateral right frontal scalp hematoma. No depressed skull fracture.   Small focus of acute subarachnoid blood in the high lateral right parietal lobe as described.   Branching linear hyperdensity in the left cerebellum is most likely calcification, perhaps vascular or more likely reflecting remote trauma; physiologic calcification is another possibility.   Mild volume loss.   Mild chronic white matter ischemic disease in the deep periventricular regions.   MACRO: Soto Oconnell discussed the significance and urgency of this critical finding by Epic Secure Chat with  GABRIEL COLBY on 12/9/2023 at 2:10 pm. (**-RCF-**) Findings:  See findings.   Signed by: Soto Oconnell 12/9/2023 2:11 PM Dictation workstation:   [id number]    CT cervical spine wo IV contrast    Result Date: 12/9/2023  Interpreted By:  Soto Oconnell, STUDY: CT CERVICAL SPINE WO IV CONTRAST;  12/9/2023 1:53 pm   INDICATION: Signs/Symptoms:fall off ladder.   COMPARISON: None.   ACCESSION NUMBER(S): [id number]   ORDERING CLINICIAN: GABRIEL COLBY   TECHNIQUE: Thin section axial images were obtained from the skull base down through the thoracic inlet. Sagittal and coronal reconstruction images were generated. Soft tissue, lung, and bone windows were reviewed.   FINDINGS: VERTEBRAL BODIES AND POSTERIOR ELEMENTS: Mild-to-moderate disc space narrowing with endplate spurring at C4-5 and C5-6. Straightening of normal cervical lordosis. Joint space loss with spurring and subchondral sclerosis in the anterior superior aspect of the atlantoaxial joint. Multilevel interfacet hypertrophy with spur formation. Uncovertebral hypertrophy with spur formation  from C3-4 through C6-7. No cervical spine compression fracture. No posterior element fracture. No destructive bone lesion. No listhesis.   SPINAL CANAL: No gross disc herniation.   NECK SOFT TISSUES: Thyroid gland is either absent or atrophic. Mild bilateral carotid bulb arterial calcifications. The left parotid gland is unremarkable. The right parotid gland appears to be fatty atrophied.   LUNG APICES: Imaged portion of the lung apices are within normal limits.   SKULL BASE: Please refer to CT scan head report also done today.       DJD in the cervical spine as described. No CT evidence of cervical spine fracture in this exam.   Apparent fatty atrophy of the right parotid gland. The thyroid gland is either absent or atrophic.   MACRO: None   Signed by: Soto Oconnell 12/9/2023 2:01 PM Dictation workstation:   [id number]      Assessment /Plan      Patient is a 88 y.o. male with PMH significant for HLD and thyroid cancer s/p thyroidectomy on synthroid who presented to [facility] ED on 12/9 as a limited trauma following a mechanical, witnessed fall with +head strike and -LOC.    List of Injuries:  Focal SAH without shift or neuro deficits  Rt frontal scalp hematoma and superficial laceration  Rt hand/wrist skin tears and ecchymosis  Rt chest wall pain    Assessment and Plan:    #SAH  - Repeat CT at 6 hours  - HOB >45  - Maintain SBP between 100 and 160  - BID keppra x7 days per Dr. Coto  - Minimize metabolic demands    > Avoid fevers    > Avoid hyponatremia     > Maintain euglycemia    > Avoid hypoxia  - NSGY consult  - NPO until repeat CT, okay for diet if stable  - mIVF with NS while NPO  - AM labs    Chronic conditions:  #HLD  #Hypothyroidism  - Continue home meds as appropriate    DVT ppx: no chemoprophylaxis, SCDs only    Dispo: admit to stepdown. Anticipate discharge to home tomorrow if repeat CT is stable.    Patient discussed with attending surgeon, Dr. Hook    I spent 60 minutes in the professional and  overall care of this patient.      Patricia Patel PA-C

## 2023-12-09 NOTE — PROGRESS NOTES
Examined. History reviewed with patient and family present. Chart reviewed where relevant. Discussed findings and clinical plan with note author. Full note to follow.    Fall from 4 foot vault while replacing ceiling tile in bank, following reef hanging. Witnessed. No LOC. Remembers fall. Neurointact.  Small focus of acute subarachnoid blood in the high lateral right parietal lobe. Superficial scalp hematoma on R forehead, minor weeping.  Prior thyroid cancer and surgery.  NSGY wants Keppra and repeat scan in 6 hours.  Step down or ICU for neurochecks Q2H.  Admitted to trauma. Hold chemoprophylaxis.      Ammon Hook MD, PhD  Available via [employer]

## 2023-12-09 NOTE — PROGRESS NOTES
Pharmacy Medication History Review    Bi Parikh is a 88 y.o. male admitted for Fall, initial encounter. Pharmacy reviewed the patient's [id number] medications and allergies for accuracy.    The list below reflectives the updated PTA list. Please review each medication in order reconciliation for additional clarification and justification.  Prior to Admission Medications   Prescriptions Last Dose Informant Patient Reported? Taking?   POTASSIUM GLUCONATE ORAL 12/9/2023  Yes Yes   Sig: Take 300 mg by mouth once daily.   ascorbic acid (Vitamin C) 500 mg tablet 12/9/2023  Yes Yes   Sig: Take 1 tablet (500 mg) by mouth once daily.   aspirin 81 mg EC tablet 12/8/2023  Yes Yes   Sig: Take 1 tablet (81 mg) by mouth once daily at bedtime.   calcium carbonate-vitamin D3 (Calcium with Vitamin D) 600 mg-10 mcg (400 unit) tablet 12/8/2023  Yes Yes   Sig: Take 1 tablet by mouth once daily at bedtime.   cholecalciferol (Vitamin D-3) 50 MCG (2000 UT) tablet 12/9/2023  Yes Yes   Sig: Take 1 tablet (2,000 Units) by mouth once daily.   ezetimibe (Zetia) 10 mg tablet 12/9/2023  Yes Yes   Sig: Take 1 tablet (10 mg) by mouth once daily.   finasteride (Proscar) 5 mg tablet 12/9/2023  Yes Yes   Sig: Take 1 tablet (5 mg) by mouth once daily.   levothyroxine (Synthroid, Levoxyl) 150 mcg tablet 12/9/2023  Yes Yes   Sig: Take 1 tablet (150 mcg) by mouth. Daily before breakfast 6 times weekly, and 1.5 tabs (225 mg) on Sundays   multivitamin tablet 12/9/2023  Yes Yes   Sig: Take 1 tablet by mouth once daily.   omega 3-dha-epa-fish oil (Fish OiL) 1,000 mg (120 mg-180 mg) capsule 12/9/2023  Yes Yes   Sig: Take 1 capsule (1,000 mg) by mouth every other day.   pantoprazole (ProtoNix) 40 mg EC tablet 12/9/2023  Yes Yes   Sig: Take 1 tablet (40 mg) by mouth once daily in the morning. Take before meals.      Facility-Administered Medications: None        The list below reflectives the updated allergy list. Please review each documented  allergy for additional clarification and justification.  Allergies  Reviewed by Alejandra Gomez CPhT on 12/9/2023   No Known Allergies         Below are additional concerns with the patient's PTA list.      Alejandra Gomez CPhT

## 2023-12-09 NOTE — ED PROVIDER NOTES
HPI   Chief Complaint   Patient presents with    Fall       This is a 88-year-old male with past medical history of hypertension presenting to the emergency department for a fall.  Patient was approximately 5 feet off the ground on a ladder trying to hang a wreath when he slipped and fell.  He did hit his head.  No LOC.  He is not on blood thinners.  Patient had some transient confusion for EMS where he was only oriented x 2 though this improved in route to the hospital.  Patient himself is denying any symptoms.  He currently feels at his baseline.  Denies any symptoms before or after including headaches, chest pain, shortness of breath, dizziness/lightheadedness, abdominal pain, back pain, urinary symptoms, lower extremity pain or swelling.      History provided by:  EMS personnel and patient   used: No                        [place] Coma Scale Score: 14                  Patient History   Past Medical History:   Diagnosis Date    Benign prostatic hyperplasia without lower urinary tract symptoms     Prostate enlargement    Personal history of malignant neoplasm of thyroid     History of malignant neoplasm of thyroid    Personal history of other endocrine, nutritional and metabolic disease     History of hypothyroidism    Personal history of other infectious and parasitic diseases 07/09/2020    History of herpes zoster     Past Surgical History:   Procedure Laterality Date    BLADDER SURGERY  12/19/2017    Bladder Surgery    OTHER SURGICAL HISTORY  12/19/2017    History Of Prior Surgery    TOTAL THYROIDECTOMY  12/19/2017    Thyroid Surgery Total Thyroidectomy     No family history on file.  Social History     Tobacco Use    Smoking status: Not on file    Smokeless tobacco: Not on file   Substance Use Topics    Alcohol use: Not on file    Drug use: Not on file       Physical Exam   ED Triage Vitals [12/09/23 1332]   Temp Heart Rate Resp BP   36.4 °C (97.5 °F) 77 18 137/69      SpO2 Temp Source  Heart Rate Source Patient Position   96 % Temporal -- --      BP Location FiO2 (%)     -- --       Physical Exam  GEN: well appearing, no acute distress  HEAD: Lateral/supraorbital hematoma with superficial laceration less than 1 cm, no active bleeding  EYES: EOMI, PEERL,  ENT: mmm, no rhinorrhea, uvula midline  NECK: supple, no C-spine tenderness, no stepoffs or deformities  CVS/CHEST: reg rate, nl rhythm, no murmurs/gallops/rubs  PULM: CTAB b/l no wheezes, crackles, or rhonchi   GI: NT/ND, no masses or organomegaly, soft, no guarding  BACK: no vertebral point tenderness  EXT: no LE edema, 2+ periph pulses in bilat radial, femoral and DP pulses.  Skin tear over dorsal right hand between first and second digits with no active bleeding, no deformity, no tenderness palpation specifically no snuffbox tenderness intact sensation and  strength in both extremities  NEURO: CN 2-12 grossly intact, no focal deficits, no facial asymmetry, moving all extremities, 5/5 Strength in bicep/tricep/hip flexor/plantar flexion. Sensation over these muscle groups intact. Able to complete finger to nose, rapid alternating movements without difficulty. Can ambulate without gait disturbance.  GCS 15  PSYCH: AAOx3 answers questions appropriately    ED Course & MDM   ED Course as of 12/11/23 1926   Sat Dec 09, 2023   1416 Patient with a small right parietal subarachnoid traumatic hemorrhage.  Also findings of scalp edema likely secondary to his fall.  Patient discussed with neurosurgery on-call Dr. Coto who is recommending 6-hour stability scan.  He also recommends patient being started on 500 of Keppra twice daily for 1 week. [DE]   1421 Patient discussed with trauma attending.  Patient will be admitted to their service for further monitoring. [DE]      ED Course User Index  [DE] Sae Henriquez MD         Diagnoses as of 12/11/23 1926   Fall, initial encounter   Subarachnoid hemorrhage following injury, no loss of consciousness,  initial encounter (CMS/[facility])       Medical Decision Making  This is a 88-year-old male with past medical history of hypertension presenting to the emergency department for a fall.  Patient stable upon presentation to the emergency department, no acute distress and vitals are unremarkable.  On exam patient's primary survey is intact.  He is answering questions appropriately and is currently oriented x 4.  He is moving all extremities.  He does have a small hematoma to the right lateral forehead just above the eyebrow.  He also has some skin tears to the right hand without any tenderness palpation.  No snuffbox tenderness palpation.  Patient fell to some mechanical in nature but we will obtain imaging of the head and neck given his age and signs of injury with transient confusion.  Also obtain x-rays to the hand.  Patient's tetanus was updated in the emergency department.  If negative and patient is able to ambulate I do feel he will be able to be discharged home.    Procedure  Critical Care    Performed by: Sae Henriquez MD  Authorized by: Sae Henriquez MD    Critical care provider statement:     Critical care time (minutes):  15    Critical care was necessary to treat or prevent imminent or life-threatening deterioration of the following conditions:  Trauma    Critical care was time spent personally by me on the following activities:  Discussions with consultants, development of treatment plan with patient or surrogate, examination of patient, obtaining history from patient or surrogate, ordering and review of laboratory studies, ordering and review of radiographic studies and re-evaluation of patient's condition    Care discussed with: admitting provider         Sae Henriquez MD  12/11/23 1927

## 2023-12-09 NOTE — ED TRIAGE NOTES
Pt fell of cabinet while hanging wreath. Pt fell and struck head, denies LOC, denies blood thinners. Per EMS, pt was alert and oriented x2, pt now A and O x4. Pt with small hematoma to R forehead. Pt denies any other pain. Pt unsure if tentanus is up to date. Bg of 123 per EMS.

## 2023-12-10 VITALS
WEIGHT: 182.98 LBS | OXYGEN SATURATION: 90 % | HEART RATE: 72 BPM | DIASTOLIC BLOOD PRESSURE: 102 MMHG | HEIGHT: 71 IN | TEMPERATURE: 99.5 F | BODY MASS INDEX: 25.62 KG/M2 | RESPIRATION RATE: 18 BRPM | SYSTOLIC BLOOD PRESSURE: 145 MMHG
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
## 2023-12-10 PROBLEM — S06.6X0A SUBARACHNOID HEMORRHAGE FOLLOWING INJURY, NO LOSS OF CONSCIOUSNESS (MULTI): Status: ACTIVE | Noted: 2023-12-10

## 2023-12-10 LAB
ANION GAP SERPL CALC-SCNC: 10 MMOL/L (ref 10–20)
BUN SERPL-MCNC: 15 MG/DL (ref 6–23)
CALCIUM SERPL-MCNC: 8.1 MG/DL (ref 8.6–10.3)
CHLORIDE SERPL-SCNC: 104 MMOL/L (ref 98–107)
CO2 SERPL-SCNC: 27 MMOL/L (ref 21–32)
CREAT SERPL-MCNC: 1.06 MG/DL (ref 0.5–1.3)
ERYTHROCYTE [DISTWIDTH] IN BLOOD BY AUTOMATED COUNT: 12.1 % (ref 11.5–14.5)
GFR SERPL CREATININE-BSD FRML MDRD: 68 ML/MIN/1.73M*2
GLUCOSE SERPL-MCNC: 111 MG/DL (ref 74–99)
HCT VFR BLD AUTO: 37 % (ref 41–52)
HGB BLD-MCNC: 12.2 G/DL (ref 13.5–17.5)
MCH RBC QN AUTO: 33.8 PG (ref 26–34)
MCHC RBC AUTO-ENTMCNC: 33 G/DL (ref 32–36)
MCV RBC AUTO: 103 FL (ref 80–100)
NRBC BLD-RTO: 0 /100 WBCS (ref 0–0)
PLATELET # BLD AUTO: 168 X10*3/UL (ref 150–450)
POTASSIUM SERPL-SCNC: 4.1 MMOL/L (ref 3.5–5.3)
RBC # BLD AUTO: 3.61 X10*6/UL (ref 4.5–5.9)
SODIUM SERPL-SCNC: 137 MMOL/L (ref 136–145)
WBC # BLD AUTO: 6 X10*3/UL (ref 4.4–11.3)

## 2023-12-10 PROCEDURE — 36415 COLL VENOUS BLD VENIPUNCTURE: CPT

## 2023-12-10 PROCEDURE — 85027 COMPLETE CBC AUTOMATED: CPT

## 2023-12-10 PROCEDURE — 2020000001 HC ICU ROOM DAILY

## 2023-12-10 PROCEDURE — 2500000002 HC RX 250 W HCPCS SELF ADMINISTERED DRUGS (ALT 637 FOR MEDICARE OP, ALT 636 FOR OP/ED)

## 2023-12-10 PROCEDURE — 82565 ASSAY OF CREATININE: CPT

## 2023-12-10 PROCEDURE — 2500000004 HC RX 250 GENERAL PHARMACY W/ HCPCS (ALT 636 FOR OP/ED)

## 2023-12-10 PROCEDURE — 99232 SBSQ HOSP IP/OBS MODERATE 35: CPT

## 2023-12-10 PROCEDURE — 2500000001 HC RX 250 WO HCPCS SELF ADMINISTERED DRUGS (ALT 637 FOR MEDICARE OP)

## 2023-12-10 RX ORDER — LEVETIRACETAM 500 MG/1
500 TABLET ORAL EVERY 12 HOURS
Qty: 11 TABLET | Refills: 0 | Status: SHIPPED | OUTPATIENT
Start: 2023-12-10 | End: 2024-01-24 | Stop reason: ALTCHOICE

## 2023-12-10 RX ADMIN — PANTOPRAZOLE SODIUM 40 MG: 40 TABLET, DELAYED RELEASE ORAL at 09:08

## 2023-12-10 RX ADMIN — SODIUM CHLORIDE 75 ML/HR: 9 INJECTION, SOLUTION INTRAVENOUS at 06:20

## 2023-12-10 RX ADMIN — EZETIMIBE 10 MG: 10 TABLET ORAL at 09:08

## 2023-12-10 RX ADMIN — LEVOTHYROXINE SODIUM 225 MCG: 0.07 TABLET ORAL at 06:18

## 2023-12-10 RX ADMIN — FINASTERIDE 5 MG: 5 TABLET, FILM COATED ORAL at 09:08

## 2023-12-10 RX ADMIN — LEVETIRACETAM 500 MG: 500 TABLET, FILM COATED ORAL at 03:48

## 2023-12-10 SDOH — SOCIAL STABILITY: SOCIAL INSECURITY: DO YOU FEEL UNSAFE GOING BACK TO THE PLACE WHERE YOU ARE LIVING?: NO

## 2023-12-10 SDOH — SOCIAL STABILITY: SOCIAL INSECURITY: ARE YOU OR HAVE YOU BEEN THREATENED OR ABUSED PHYSICALLY, EMOTIONALLY, OR SEXUALLY BY ANYONE?: NO

## 2023-12-10 SDOH — SOCIAL STABILITY: SOCIAL INSECURITY: HAS ANYONE EVER THREATENED TO HURT YOUR FAMILY OR YOUR PETS?: NO

## 2023-12-10 SDOH — SOCIAL STABILITY: SOCIAL INSECURITY: HAVE YOU HAD THOUGHTS OF HARMING ANYONE ELSE?: NO

## 2023-12-10 SDOH — SOCIAL STABILITY: SOCIAL INSECURITY: DOES ANYONE TRY TO KEEP YOU FROM HAVING/CONTACTING OTHER FRIENDS OR DOING THINGS OUTSIDE YOUR HOME?: NO

## 2023-12-10 SDOH — SOCIAL STABILITY: SOCIAL INSECURITY: ABUSE: ADULT

## 2023-12-10 SDOH — SOCIAL STABILITY: SOCIAL INSECURITY: ARE THERE ANY APPARENT SIGNS OF INJURIES/BEHAVIORS THAT COULD BE RELATED TO ABUSE/NEGLECT?: NO

## 2023-12-10 SDOH — SOCIAL STABILITY: SOCIAL INSECURITY: DO YOU FEEL ANYONE HAS EXPLOITED OR TAKEN ADVANTAGE OF YOU FINANCIALLY OR OF YOUR PERSONAL PROPERTY?: NO

## 2023-12-10 ASSESSMENT — ACTIVITIES OF DAILY LIVING (ADL)
BATHING: NEEDS ASSISTANCE
HEARING - RIGHT EAR: FUNCTIONAL
ASSISTIVE_DEVICE: EYEGLASSES
TOILETING: NEEDS ASSISTANCE
DRESSING YOURSELF: NEEDS ASSISTANCE
PATIENT'S MEMORY ADEQUATE TO SAFELY COMPLETE DAILY ACTIVITIES?: YES
FEEDING YOURSELF: NEEDS ASSISTANCE
GROOMING: NEEDS ASSISTANCE
WALKS IN HOME: NEEDS ASSISTANCE
HEARING - LEFT EAR: FUNCTIONAL
JUDGMENT_ADEQUATE_SAFELY_COMPLETE_DAILY_ACTIVITIES: YES
ADEQUATE_TO_COMPLETE_ADL: YES
LACK_OF_TRANSPORTATION: NO

## 2023-12-10 ASSESSMENT — LIFESTYLE VARIABLES
SKIP TO QUESTIONS 9-10: 1
HOW OFTEN DO YOU HAVE 6 OR MORE DRINKS ON ONE OCCASION: NEVER
HOW MANY STANDARD DRINKS CONTAINING ALCOHOL DO YOU HAVE ON A TYPICAL DAY: 1 OR 2
AUDIT-C TOTAL SCORE: 2
AUDIT-C TOTAL SCORE: 2
HOW OFTEN DO YOU HAVE A DRINK CONTAINING ALCOHOL: 2-4 TIMES A MONTH
SUBSTANCE_ABUSE_PAST_12_MONTHS: NO
PRESCIPTION_ABUSE_PAST_12_MONTHS: NO

## 2023-12-10 ASSESSMENT — PATIENT HEALTH QUESTIONNAIRE - PHQ9
2. FEELING DOWN, DEPRESSED OR HOPELESS: NOT AT ALL
1. LITTLE INTEREST OR PLEASURE IN DOING THINGS: NOT AT ALL
SUM OF ALL RESPONSES TO PHQ9 QUESTIONS 1 & 2: 0

## 2023-12-10 ASSESSMENT — COGNITIVE AND FUNCTIONAL STATUS - GENERAL
MOBILITY SCORE: 14
DRESSING REGULAR LOWER BODY CLOTHING: A LITTLE
DAILY ACTIVITIY SCORE: 18
EATING MEALS: A LITTLE
HELP NEEDED FOR BATHING: A LITTLE
PATIENT BASELINE BEDBOUND: NO
TURNING FROM BACK TO SIDE WHILE IN FLAT BAD: A LITTLE
MOVING TO AND FROM BED TO CHAIR: A LOT
MOVING FROM LYING ON BACK TO SITTING ON SIDE OF FLAT BED WITH BEDRAILS: A LITTLE
WALKING IN HOSPITAL ROOM: A LOT
CLIMB 3 TO 5 STEPS WITH RAILING: A LOT
TOILETING: A LITTLE
PERSONAL GROOMING: A LITTLE
STANDING UP FROM CHAIR USING ARMS: A LOT
DRESSING REGULAR UPPER BODY CLOTHING: A LITTLE

## 2023-12-10 ASSESSMENT — PAIN SCALES - GENERAL
PAINLEVEL_OUTOF10: 0 - NO PAIN
PAINLEVEL_OUTOF10: 2
PAINLEVEL_OUTOF10: 0 - NO PAIN

## 2023-12-10 ASSESSMENT — PAIN - FUNCTIONAL ASSESSMENT
PAIN_FUNCTIONAL_ASSESSMENT: 0-10

## 2023-12-10 ASSESSMENT — ENCOUNTER SYMPTOMS: ACTIVITY CHANGE: 1

## 2023-12-10 NOTE — DISCHARGE SUMMARY
Discharge Diagnosis  Fall, initial encounter    Issues Requiring Follow-Up  SAH    Test Results Pending At Discharge  Pending Labs       Order Current Status    Extra Tubes Preliminary result    Green Top Preliminary result            Hospital Course   Patient is a 88 y.o. male admitted on 12/9/2023  1:30 PM  Arrival Date: 12/09/23  Activation Time: 1321  Trauma Activation Level: limited  Date of injury: today  Time of injury: immediately prior to arrival     HPI:  Pt reports he was helping set up for a skye party, standing on a counter a few feet off the ground when he lost his balance and fell. +Head strike, -LOC, witnessed fall. Pt reported was a bit disoriented at the scene per EMS, A&Ox2 only, but pt denies this. He denies HA, visual changes, dizziness/lightheadedness, weakness, paresthesias, or N/V. He endorses some mild Rt chest wall pain with movement that started after he arrived to the ED, but he states it just feels like a sore muscle and is not worse with breathing. He denies SOB or chest pain. Pt reports he takes a baby ASA daily.    List of Injuries:  Focal SAH without shift or neuro deficits - stable on repeat CT  Rt frontal scalp hematoma and superficial laceration  Rt hand/wrist skin tears and ecchymosis  Rt chest wall pain - resolved     6 hr repeat CT reviewed by trauma team and NSGY, no formal read yet. Pt has no neurologic symptoms or deficits and denies any other pain. Dr. Hook discussed pt with Dr. Jeff Coto who cleared the patient for discharge, no follow-up needed. No further instructions were given. Will add general TBI discharge instructions and advise pt to hold ASA for 6 weeks. He can follow-up with his PCP as son as able, and NSGY only as needed.     Pertinent Physical Exam At Time of Discharge  Physical Exam  Constitutional:       General: He is not in acute distress.     Appearance: Normal appearance. He is not ill-appearing.   HENT:      Head:      Comments: Rt scalp  dressing c/d/i     Right Ear: External ear normal.      Left Ear: External ear normal.      Nose: Nose normal.      Mouth/Throat:      Mouth: Mucous membranes are moist.   Eyes:      Extraocular Movements: Extraocular movements intact.      Conjunctiva/sclera: Conjunctivae normal.      Pupils: Pupils are equal, round, and reactive to light.   Cardiovascular:      Rate and Rhythm: Normal rate and regular rhythm.      Heart sounds: No murmur heard.  Pulmonary:      Effort: Pulmonary effort is normal. No respiratory distress.      Breath sounds: Normal breath sounds.   Abdominal:      General: There is no distension.      Palpations: Abdomen is soft.      Tenderness: There is no abdominal tenderness.   Musculoskeletal:         General: No swelling or tenderness. Normal range of motion.      Cervical back: Normal range of motion.      Comments: Rt UE skin tears and ecchymoses - unchanged from prior exam   Skin:     General: Skin is warm and dry.      Coloration: Skin is not jaundiced or pale.   Neurological:      Mental Status: He is alert and oriented to person, place, and time.      GCS: GCS eye subscore is 4. GCS verbal subscore is 5. GCS motor subscore is 6.      Cranial Nerves: Cranial nerves 2-12 are intact.      Sensory: Sensation is intact.      Motor: Motor function is intact.         Home Medications     Medication List      START taking these medications     levETIRAcetam 500 mg tablet; Commonly known as: Keppra; Take 1 tablet   (500 mg) by mouth every 12 hours for 6 days.     CONTINUE taking these medications     ascorbic acid 500 mg tablet; Commonly known as: Vitamin C   Calcium with Vitamin D 600 mg-10 mcg (400 unit) tablet; Generic drug:   calcium carbonate-vitamin D3   cholecalciferol 50 MCG (2000 UT) tablet; Commonly known as: Vitamin D-3   ezetimibe 10 mg tablet; Commonly known as: Zetia   finasteride 5 mg tablet; Commonly known as: Proscar   Fish OiL 1,000 mg (120 mg-180 mg) capsule; Generic drug:  omega   3-dha-epa-fish oil   levothyroxine 150 mcg tablet; Commonly known as: Synthroid, Levoxyl   multivitamin tablet   pantoprazole 40 mg EC tablet; Commonly known as: ProtoNix   POTASSIUM GLUCONATE ORAL     STOP taking these medications     aspirin 81 mg EC tablet       Outpatient Follow-Up  No future appointments.    Patricia Patel PA-C

## 2023-12-10 NOTE — H&P
History Of Present Illness  Bi Parikh is a 88 y.o. male presenting with with fall and [employer] with a head injury..    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    Surgical History  He has a past surgical history that includes Bladder surgery (12/19/2017); Other surgical history (12/19/2017); and Total thyroidectomy (12/19/2017).     Social History  He reports that he has never smoked. He has never used smokeless tobacco. He reports current alcohol use of about 1.0 standard drink of alcohol per week. He reports that he does not use drugs.    Family History  No family history on file.     Allergies  Patient has no known allergies.    Medications    Current Facility-Administered Medications:     [DISCONTINUED] acetaminophen (Tylenol) oral liquid 650 mg, 650 mg, oral, q4h PRN **OR** acetaminophen (Tylenol) tablet 650 mg, 650 mg, oral, q4h PRN, Patricia Patel PA-C, 650 mg at 12/09/23 1811    ezetimibe (Zetia) tablet 10 mg, 10 mg, oral, Daily, Patricia Patel PA-C, 10 mg at 12/10/23 0908    finasteride (Proscar) tablet 5 mg, 5 mg, oral, Daily, Patricia Patel PA-C, 5 mg at 12/10/23 0908    hydrALAZINE (Apresoline) injection 10 mg, 10 mg, intravenous, q20 min PRN, Patricia Patel,  PREMA    HYDROmorphone (Dilaudid) injection 0.5 mg, 0.5 mg, intravenous, q4h PRN, Patricia Patel PA-C    labetaloL (Normodyne,Trandate) injection 10 mg, 10 mg, intravenous, q6h PRN, Patricia Patel PA-C    [DISCONTINUED] levETIRAcetam in NaCl (iso-os) (Keppra)  mg, 500 mg, intravenous, q12h **OR** levETIRAcetam (Keppra) tablet 500 mg, 500 mg, oral, q12h, Patricia Patel PA-C, 500 mg at 12/10/23 0348    [START ON 12/11/2023] levothyroxine (Synthroid, Levoxyl) tablet 150 mcg, 150 mcg, oral, Once per day on Mon Tue Wed Thu Fri Sat, Patricia Patel PA-C    levothyroxine (Synthroid, Levoxyl) tablet 225 mcg, 225 mcg, oral, Once per day on Sun, Patricia Patel PA-C, 225 mcg at 12/10/23 0618    oxyCODONE (Roxicodone) immediate release tablet 5 mg, 5 mg, oral, q4h PRN, Patricia Patel PA-C    pantoprazole (ProtoNix) EC tablet 40 mg, 40 mg, oral, Daily, Ptaricia Patel PA-C, 40 mg at 12/10/23 0908    sodium chloride 0.9% infusion, 100 mL/hr, intravenous, Continuous, Patricia Patel PA-C, Last Rate: 100 mL/hr at 12/10/23 0631, 100 mL/hr at 12/10/23 0631    Review of Systems   Constitutional:  Positive for activity change.        Physical Exam  Vitals and nursing note reviewed.   Constitutional:       Appearance: Normal appearance.   HENT:      Head: Normocephalic.      Right Ear: Tympanic membrane, ear canal and external ear normal.      Left Ear: Tympanic membrane, ear canal and external ear normal.      Nose: Nose normal.      Mouth/Throat:      Mouth: Mucous membranes are moist.      Pharynx: Oropharynx is clear.   Eyes:      Extraocular Movements: Extraocular movements intact.      Conjunctiva/sclera: Conjunctivae normal.      Pupils: Pupils are equal, round, and reactive to light.   Cardiovascular:      Rate and Rhythm: Normal rate and regular rhythm.      Pulses: Normal pulses.      Heart sounds: Normal heart sounds.   Pulmonary:      Effort: Pulmonary effort is  normal.      Breath sounds: Normal breath sounds.   Abdominal:      General: Abdomen is flat. Bowel sounds are normal.      Palpations: Abdomen is soft.   Genitourinary:     Penis: Normal.       Testes: Normal.      Prostate: Normal.      Rectum: Normal.   Musculoskeletal:         General: Normal range of motion.      Cervical back: Normal range of motion and neck supple.   Skin:     General: Skin is warm and dry.   Neurological:      General: No focal deficit present.      Mental Status: He is alert and oriented to person, place, and time. Mental status is at baseline.   Psychiatric:         Mood and Affect: Mood normal.         Behavior: Behavior normal.         Thought Content: Thought content normal.         Judgment: Judgment normal.          Last Recorded Vitals  BP (!) 145/102   Pulse 72   Temp 36.8 °C (98.2 °F) (Temporal)   Resp 18   Wt 83 kg (182 lb 15.7 oz)   SpO2 90%     Relevant Results      Results for orders placed or performed during the hospital encounter of 12/09/23 (from the past 24 hour(s))   CBC and Auto Differential   Result Value Ref Range    WBC 7.1 4.4 - 11.3 x10*3/uL    nRBC 0.0 0.0 - 0.0 /100 WBCs    RBC 4.09 (L) 4.50 - 5.90 x10*6/uL    Hemoglobin 13.8 13.5 - 17.5 g/dL    Hematocrit 41.1 41.0 - 52.0 %     (H) 80 - 100 fL    MCH 33.7 26.0 - 34.0 pg    MCHC 33.6 32.0 - 36.0 g/dL    RDW 12.0 11.5 - 14.5 %    Platelets 192 150 - 450 x10*3/uL    Neutrophils % 81.7 40.0 - 80.0 %    Immature Granulocytes %, Automated 0.8 0.0 - 0.9 %    Lymphocytes % 8.5 13.0 - 44.0 %    Monocytes % 7.2 2.0 - 10.0 %    Eosinophils % 1.4 0.0 - 6.0 %    Basophils % 0.4 0.0 - 2.0 %    Neutrophils Absolute 5.76 (H) 1.60 - 5.50 x10*3/uL    Immature Granulocytes Absolute, Automated 0.06 0.00 - 0.50 x10*3/uL    Lymphocytes Absolute 0.60 (L) 0.80 - 3.00 x10*3/uL    Monocytes Absolute 0.51 0.05 - 0.80 x10*3/uL    Eosinophils Absolute 0.10 0.00 - 0.40 x10*3/uL    Basophils Absolute 0.03 0.00 - 0.10 x10*3/uL    Comprehensive metabolic panel   Result Value Ref Range    Glucose 94 74 - 99 mg/dL    Sodium 139 136 - 145 mmol/L    Potassium 4.3 3.5 - 5.3 mmol/L    Chloride 106 98 - 107 mmol/L    Bicarbonate 25 21 - 32 mmol/L    Anion Gap 12 10 - 20 mmol/L    Urea Nitrogen 17 6 - 23 mg/dL    Creatinine 1.12 0.50 - 1.30 mg/dL    eGFR 64 >60 mL/min/1.73m*2    Calcium 8.9 8.6 - 10.3 mg/dL    Albumin 3.5 3.4 - 5.0 g/dL    Alkaline Phosphatase 65 33 - 136 U/L    Total Protein 5.9 (L) 6.4 - 8.2 g/dL    AST 22 9 - 39 U/L    Bilirubin, Total 0.5 0.0 - 1.2 mg/dL    ALT 18 10 - 52 U/L   Type and Screen   Result Value Ref Range    ABO TYPE A     Rh TYPE NEG     ANTIBODY SCREEN NEG    Coagulation Screen   Result Value Ref Range    Protime 12.0 9.8 - 12.8 seconds    INR 1.1 0.9 - 1.1    aPTT 30 27 - 38 seconds   Green Top   Result Value Ref Range    Extra Tube Hold for add-ons.    CBC   Result Value Ref Range    WBC 6.0 4.4 - 11.3 x10*3/uL    nRBC 0.0 0.0 - 0.0 /100 WBCs    RBC 3.61 (L) 4.50 - 5.90 x10*6/uL    Hemoglobin 12.2 (L) 13.5 - 17.5 g/dL    Hematocrit 37.0 (L) 41.0 - 52.0 %     (H) 80 - 100 fL    MCH 33.8 26.0 - 34.0 pg    MCHC 33.0 32.0 - 36.0 g/dL    RDW 12.1 11.5 - 14.5 %    Platelets 168 150 - 450 x10*3/uL   Basic Metabolic Panel   Result Value Ref Range    Glucose 111 (H) 74 - 99 mg/dL    Sodium 137 136 - 145 mmol/L    Potassium 4.1 3.5 - 5.3 mmol/L    Chloride 104 98 - 107 mmol/L    Bicarbonate 27 21 - 32 mmol/L    Anion Gap 10 10 - 20 mmol/L    Urea Nitrogen 15 6 - 23 mg/dL    Creatinine 1.06 0.50 - 1.30 mg/dL    eGFR 68 >60 mL/min/1.73m*2    Calcium 8.1 (L) 8.6 - 10.3 mg/dL     Assessment/Plan   Patient Active Problem List   Diagnosis    Hypercholesteremia    Hypothyroidism    Vitamin D deficiency    Subarachnoid hemorrhage following injury, no loss of consciousness (CMS/HCC)      Head injury with subarachnoid hemorrhage.  Patient at baseline and cleared by neurosurgery.  Patient needs to have close observation of  blood pressures.  Instructed to see PCP this coming week    Juwan Rudolph MD

## 2023-12-10 NOTE — PROGRESS NOTES
12/10/23 1200   Discharge Planning   Living Arrangements Spouse/significant other   Support Systems Spouse/significant other   Assistance Needed none   Type of Residence Private residence   Number of Stairs to Enter Residence 0   Patient expects to be discharged to: Home     12/10/2023   Met with pt in room, introduced self and explained role. Verified insurance, address, phone.   PCP- Kin Cabrera  Pharmacy- [facility] [place]  Pt is from home, lives with son.  Pt states he is independent, no use of any assistive devices.  Performs own ADL's.  He and son care for the home.  Pt still drives. Son works.  CT team will follow for any needs upon discharge.  Monica Alva RN T[facility]

## 2023-12-11 ENCOUNTER — APPOINTMENT (OUTPATIENT)
Dept: RADIOLOGY | Facility: HOSPITAL | Age: 88
End: 2023-12-11
Payer: MEDICARE

## 2023-12-11 ENCOUNTER — HOSPITAL ENCOUNTER (EMERGENCY)
Facility: HOSPITAL | Age: 88
Discharge: HOME | End: 2023-12-11
Attending: STUDENT IN AN ORGANIZED HEALTH CARE EDUCATION/TRAINING PROGRAM
Payer: MEDICARE

## 2023-12-11 ENCOUNTER — TELEPHONE (OUTPATIENT)
Dept: SURGERY | Facility: CLINIC | Age: 88
End: 2023-12-11
Payer: MEDICARE

## 2023-12-11 VITALS
BODY MASS INDEX: 25.4 KG/M2 | SYSTOLIC BLOOD PRESSURE: 126 MMHG | RESPIRATION RATE: 16 BRPM | HEART RATE: 58 BPM | WEIGHT: 182 LBS | TEMPERATURE: 97.9 F | DIASTOLIC BLOOD PRESSURE: 69 MMHG | OXYGEN SATURATION: 94 %

## 2023-12-11 DIAGNOSIS — I62.9 INTRACRANIAL BLEED (MULTI): Primary | ICD-10-CM

## 2023-12-11 LAB
ANION GAP SERPL CALC-SCNC: 7 MMOL/L (ref 10–20)
BASOPHILS # BLD AUTO: 0.02 X10*3/UL (ref 0–0.1)
BASOPHILS NFR BLD AUTO: 0.4 %
BUN SERPL-MCNC: 16 MG/DL (ref 6–23)
CALCIUM SERPL-MCNC: 8.2 MG/DL (ref 8.6–10.3)
CHLORIDE SERPL-SCNC: 106 MMOL/L (ref 98–107)
CO2 SERPL-SCNC: 28 MMOL/L (ref 21–32)
CREAT SERPL-MCNC: 1.03 MG/DL (ref 0.5–1.3)
EOSINOPHIL # BLD AUTO: 0.05 X10*3/UL (ref 0–0.4)
EOSINOPHIL NFR BLD AUTO: 0.9 %
ERYTHROCYTE [DISTWIDTH] IN BLOOD BY AUTOMATED COUNT: 12 % (ref 11.5–14.5)
GFR SERPL CREATININE-BSD FRML MDRD: 70 ML/MIN/1.73M*2
GLUCOSE SERPL-MCNC: 129 MG/DL (ref 74–99)
HCT VFR BLD AUTO: 37.4 % (ref 41–52)
HGB BLD-MCNC: 12.6 G/DL (ref 13.5–17.5)
IMM GRANULOCYTES # BLD AUTO: 0.02 X10*3/UL (ref 0–0.5)
IMM GRANULOCYTES NFR BLD AUTO: 0.4 % (ref 0–0.9)
INR PPP: 1.2 (ref 0.9–1.1)
LYMPHOCYTES # BLD AUTO: 0.62 X10*3/UL (ref 0.8–3)
LYMPHOCYTES NFR BLD AUTO: 11 %
MCH RBC QN AUTO: 34.3 PG (ref 26–34)
MCHC RBC AUTO-ENTMCNC: 33.7 G/DL (ref 32–36)
MCV RBC AUTO: 102 FL (ref 80–100)
MONOCYTES # BLD AUTO: 0.64 X10*3/UL (ref 0.05–0.8)
MONOCYTES NFR BLD AUTO: 11.3 %
NEUTROPHILS # BLD AUTO: 4.3 X10*3/UL (ref 1.6–5.5)
NEUTROPHILS NFR BLD AUTO: 76 %
NRBC BLD-RTO: 0 /100 WBCS (ref 0–0)
PLATELET # BLD AUTO: 153 X10*3/UL (ref 150–450)
POTASSIUM SERPL-SCNC: 4.2 MMOL/L (ref 3.5–5.3)
PROTHROMBIN TIME: 13.1 SECONDS (ref 9.8–12.8)
RBC # BLD AUTO: 3.67 X10*6/UL (ref 4.5–5.9)
SODIUM SERPL-SCNC: 137 MMOL/L (ref 136–145)
WBC # BLD AUTO: 5.7 X10*3/UL (ref 4.4–11.3)

## 2023-12-11 PROCEDURE — 70496 CT ANGIOGRAPHY HEAD: CPT

## 2023-12-11 PROCEDURE — 70450 CT HEAD/BRAIN W/O DYE: CPT | Performed by: RADIOLOGY

## 2023-12-11 PROCEDURE — 85025 COMPLETE CBC W/AUTO DIFF WBC: CPT | Performed by: STUDENT IN AN ORGANIZED HEALTH CARE EDUCATION/TRAINING PROGRAM

## 2023-12-11 PROCEDURE — 99285 EMERGENCY DEPT VISIT HI MDM: CPT | Mod: 25 | Performed by: STUDENT IN AN ORGANIZED HEALTH CARE EDUCATION/TRAINING PROGRAM

## 2023-12-11 PROCEDURE — 70450 CT HEAD/BRAIN W/O DYE: CPT | Mod: 59

## 2023-12-11 PROCEDURE — 2550000001 HC RX 255 CONTRASTS: Performed by: STUDENT IN AN ORGANIZED HEALTH CARE EDUCATION/TRAINING PROGRAM

## 2023-12-11 PROCEDURE — 70496 CT ANGIOGRAPHY HEAD: CPT | Performed by: RADIOLOGY

## 2023-12-11 PROCEDURE — 85610 PROTHROMBIN TIME: CPT | Performed by: STUDENT IN AN ORGANIZED HEALTH CARE EDUCATION/TRAINING PROGRAM

## 2023-12-11 PROCEDURE — 36415 COLL VENOUS BLD VENIPUNCTURE: CPT | Performed by: STUDENT IN AN ORGANIZED HEALTH CARE EDUCATION/TRAINING PROGRAM

## 2023-12-11 PROCEDURE — 80048 BASIC METABOLIC PNL TOTAL CA: CPT | Performed by: STUDENT IN AN ORGANIZED HEALTH CARE EDUCATION/TRAINING PROGRAM

## 2023-12-11 RX ADMIN — IOHEXOL 70 ML: 350 INJECTION, SOLUTION INTRAVENOUS at 15:44

## 2023-12-11 NOTE — ED PROVIDER NOTES
HPI   Chief Complaint   Patient presents with    Sent by PMD for head CT     Patient was seen here 2 days ago for fall, states he is feeling better just sore, was called today by his PMD ad told to go to ER for another CT scan. Patient denies any headaches, dizziness.       Recent admission for traumatic subarachnoid hemorrhage presents to the emergency department for reports of new bleeds on repeat CT imaging.  Patient reportedly had a mechanical fall in 12/9/2023, he sustained a subarachnoid hemorrhage.  Neurosurgery was consulted and the patient was admitted to the trauma service.  He was ultimately discharged after bleed was noted to be stable.  Official radiology read concerning for new bleeds.  Patient was called back to the ER by the surgeon for further management.  My evaluation, patient states he has no complaints.  No repeat falls.  Denies headache, vision changes, chest pain, shortness of breath, focal numbness/weakness, difficulty talking, and difficulty swallowing.  Not on anticoagulation.  States that he takes a baby aspirin, and has not taken this today.      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 s/p thyroidectomy 2009    Urinary frequency 12/31/2003     Past Surgical History:   Procedure Laterality Date    BLADDER SURGERY  12/19/2017    Bladder Surgery    CT HEAD ANGIO W AND WO IV CONTRAST  12/11/2023    CT HEAD ANGIO W AND WO IV CONTRAST 12/11/2023 PAR CT    OTHER SURGICAL HISTORY  12/19/2017    History Of Prior Surgery    TOTAL THYROIDECTOMY  12/19/2017    Thyroid Surgery Total Thyroidectomy     No family history on file.  Social History     Tobacco Use    Smoking status: Never    Smokeless tobacco: Never   Vaping Use    Vaping Use: Never used   Substance Use Topics    Alcohol use: Yes     Alcohol/week: 1.0 standard drink of alcohol     Types: 1 Cans of beer per week    Drug use: Never       Physical Exam   ED Triage Vitals [12/11/23 1153]   Temp Heart Rate Resp BP   36.6 °C (97.9 °F) 73 18 126/64      SpO2 Temp Source Heart Rate Source Patient Position   99 % Temporal Monitor Sitting      BP Location FiO2 (%)     Right arm --       Physical Exam  Vitals and nursing note reviewed.   HENT:      Head: Atraumatic.      Mouth/Throat:      Mouth: Mucous membranes are moist.   Eyes:      Extraocular Movements: Extraocular movements intact.      Pupils: Pupils are equal, round, and reactive to light.      Comments: Some conjunctival ecchymosis of the right eye.  There is right periorbital ecchymosis.  No proptosis.  No apparent pain with extraocular movements.   Cardiovascular:      Rate and Rhythm: Normal rate and regular rhythm.      Pulses: Normal pulses.   Pulmonary:      Effort: Pulmonary effort is normal.      Breath sounds: Normal breath sounds.   Abdominal:      Palpations: Abdomen is soft.      Tenderness: There is no abdominal tenderness.   Musculoskeletal:         General: Normal range  of motion.      Cervical back: Normal range of motion. No rigidity or tenderness.      Comments: All extremities are neurovascularly intact.   Skin:     General: Skin is warm and dry.   Neurological:      Mental Status: He is alert.      Comments: GCS 15.  Mentating appropriately.  Cranial nerves II through XII grossly intact.  No truncal ataxia.  No extremity ataxia with finger-nose-finger or heel-to-shin.  Was able to get out of the wheelchair and ambulate to the ER bed.  No drift with any extremities.  Sensation intact to light touch in all extremities.  No dysarthria.  No aphasia.  No extinction or inattention.         ED Course & MDM   ED Course as of 12/11/23 1848   Mon Dec 11, 2023   1210 CT head wo IV contrast  Per my view, the previously reported hemorrhage in the superior left cerebellar  hemisphere/vermis at the tentorial notch appears unchanged to improved.  I do not particularly appreciate the bleed noted in the posterior  margin of the right superior sagittal sulcus.  Will await official radiology read. [AB]   1223 Spoke with Dr. Riley Piña with neurosurgery.  Per his review, states that bleeds on CT imaging are stable and no acute interventions are indicated at this time.  He does recommend obtaining a CT angiogram, given the patient's multiple bleeds.  If this is unremarkable, patient can be safely discharged.  Obviously, if not unremarkable, will reconference with neurosurgery. [AB]   1241 CT head wo IV contrast  Unchanged SAH per official Rads read [AB]      ED Course User Index  [AB] Mike Sadler MD         Diagnoses as of 12/11/23 1848   Intracranial bleed (CMS/HCC)       Medical Decision Making  Well-appearing.  Nonfocal neurologic exam.  CT of the head discussed with the neurosurgeon, as outlined above in the MD course.  Subsequently obtained CTA, which was also reviewed by the neurosurgeon; no indication for any acute interventions or further diagnostics.  Neurosurgery does recommend  repeat head CT in 2 weeks, which I relayed to the patient.  Discharged with return precautions.    Amount and/or Complexity of Data Reviewed  Radiology: ordered and independent interpretation performed. Decision-making details documented in ED Course.  Discussion of management or test interpretation with external provider(s): The on-call neurosurgeon, Dr. Riley Piña.        Procedure  Procedures     Mike Sadler MD  12/11/23 8516

## 2023-12-11 NOTE — DISCHARGE INSTRUCTIONS
You will need a ride repeat head CT in 2 weeks with neurosurgery.  I have given you information for this provider.

## 2023-12-11 NOTE — PROGRESS NOTES
Pharmacy Medication History Review    Bi Parikh is a 88 y.o. male admitted for No Principal Problem: There is no principal problem currently on the Problem List. Please update the Problem List and refresh.. Pharmacy reviewed the patient's [id number] medications and allergies for accuracy.    The list below reflectives the updated PTA list. Please review each medication in order reconciliation for additional clarification and justification.  (Not in a hospital admission)       The list below reflectives the updated allergy list. Please review each documented allergy for additional clarification and justification.  Allergies  Reviewed by Shruthi Delgado RN on 12/11/2023   No Known Allergies         Below are additional concerns with the patient's PTA list.    See PTA med list    Aury Leija CPhT

## 2023-12-11 NOTE — TELEPHONE ENCOUNTER
Received a message from Dr. Hook that patient needs to head back into the emergency room for a STAT ct of his head as that would be a faster way to obtain the results.  I had to leave a message with patient, and was able to speak with patients daughter, Karen Song.  I explained the importance of getting him back into the emergency room and she verbalized frustration that he was even discharged but said she would handle it and get him into the emergency room.  Dr. Hook was made aware of this.

## 2023-12-13 LAB — HOLD SPECIMEN: NORMAL

## 2023-12-18 ENCOUNTER — TELEPHONE (OUTPATIENT)
Dept: NEUROSURGERY | Facility: CLINIC | Age: 88
End: 2023-12-18
Payer: MEDICARE

## 2023-12-19 DIAGNOSIS — I60.9 SUBARACHNOID HEMORRHAGE (MULTI): Primary | ICD-10-CM

## 2024-01-12 ENCOUNTER — APPOINTMENT (OUTPATIENT)
Dept: NEUROSURGERY | Facility: CLINIC | Age: 89
End: 2024-01-12
Payer: MEDICARE

## 2024-01-16 ENCOUNTER — ANCILLARY PROCEDURE (OUTPATIENT)
Dept: RADIOLOGY | Facility: CLINIC | Age: 89
End: 2024-01-16
Payer: MEDICARE

## 2024-01-16 DIAGNOSIS — I60.9 SUBARACHNOID HEMORRHAGE (MULTI): ICD-10-CM

## 2024-01-16 PROCEDURE — 70450 CT HEAD/BRAIN W/O DYE: CPT | Performed by: RADIOLOGY

## 2024-01-16 PROCEDURE — 70450 CT HEAD/BRAIN W/O DYE: CPT

## 2024-01-24 ENCOUNTER — OFFICE VISIT (OUTPATIENT)
Dept: NEUROSURGERY | Facility: CLINIC | Age: 89
End: 2024-01-24
Payer: MEDICARE

## 2024-01-24 VITALS
BODY MASS INDEX: 26.48 KG/M2 | SYSTOLIC BLOOD PRESSURE: 118 MMHG | WEIGHT: 185 LBS | DIASTOLIC BLOOD PRESSURE: 70 MMHG | TEMPERATURE: 98 F | HEIGHT: 70 IN | HEART RATE: 72 BPM

## 2024-01-24 DIAGNOSIS — S06.6X0A SUBARACHNOID HEMORRHAGE FOLLOWING INJURY, NO LOSS OF CONSCIOUSNESS, INITIAL ENCOUNTER (MULTI): ICD-10-CM

## 2024-01-24 PROCEDURE — 1126F AMNT PAIN NOTED NONE PRSNT: CPT | Performed by: NURSE PRACTITIONER

## 2024-01-24 PROCEDURE — 1036F TOBACCO NON-USER: CPT | Performed by: NURSE PRACTITIONER

## 2024-01-24 PROCEDURE — 1159F MED LIST DOCD IN RCRD: CPT | Performed by: NURSE PRACTITIONER

## 2024-01-24 PROCEDURE — 1157F ADVNC CARE PLAN IN RCRD: CPT | Performed by: NURSE PRACTITIONER

## 2024-01-24 PROCEDURE — 99202 OFFICE O/P NEW SF 15 MIN: CPT | Performed by: NURSE PRACTITIONER

## 2024-01-24 ASSESSMENT — PAIN SCALES - GENERAL: PAINLEVEL: 0-NO PAIN

## 2024-01-24 ASSESSMENT — PATIENT HEALTH QUESTIONNAIRE - PHQ9
1. LITTLE INTEREST OR PLEASURE IN DOING THINGS: NOT AT ALL
SUM OF ALL RESPONSES TO PHQ9 QUESTIONS 1 & 2: 0
2. FEELING DOWN, DEPRESSED OR HOPELESS: NOT AT ALL

## 2024-01-24 NOTE — PROGRESS NOTES
"It was a pleasure to see Bi Parikh on 1/24/2024. He is an 88 y.o. year old, left-handed male who presents, with his daughter, Karen, to the [facility] for evaluation of subarachnoid hemorrhage (SAH). Patient is referred by Patricia Patel, *. PMH is significant for HTN / HPL    Bi Parikh has had fallen off a ladder (5 feet) on 12/09/2023. He had brief episode of confusion and was taken by EMS  to [facility] ED where CT Head demonstrated left superior vermis and right superior sagittal sulcus SAH. He was on no blood thinners at the time of the incident. He was discharged home on 12/10/2023, after repeat CT Head. CT Head was noted to have expanding SAH after he was discharged home; he was then brought back to [facility] ED for repeat CT Head, which demonstrated stable SAH, with no new findings on CTA Brain. He was instructed to complete one week of Keppra and to follow up in our office with repeat CT Head. Thus far, patient has had no difficulties. He denies headaches, seizure activity, vision changes, difficulty performing ADLs.     CT HEAD without contrast on 01/16/2024:  IMPRESSION:  1. Interval resolution of hemorrhage in the left ambient cistern. No  significant change in appearance of curvilinear hyperdensity in the  left cerebellar hemisphere. No new intracranial hemorrhage is  identified.  2. Nonspecific white matter changes are favored to reflect  small-vessel ischemic disease in a patient of this age.  Signed by: Eden Camilo      PREVIOUS TREATMENTS  Avoidance of anticoagulation    Smoker: No   Anticoagulation / Antiplatelets: No     ROS: 12 / 12 systems reviewed and are negative unless noted in HPI    /70 (BP Location: Right arm, Patient Position: Sitting, BP Cuff Size: Adult)   Pulse 72   Temp 36.7 °C (98 °F) (Temporal)   Ht 1.778 m (5' 10\")   Wt 83.9 kg (185 lb)   BMI 26.54 kg/m²     ON EXAM:  General: Well developed, awake/alert/oriented x " 3, no distress, alert and cooperative  Skin: Warm and dry, no visible lesions / rashes  ENMT: Mucous membranes moist, no apparent injury  Head/Neck: No apparent injury  Respiratory/Thorax: Normal breathing with good chest expansion, thorax symmetric  Cardiovascular: No JVD  Gastrointestinal: Non-distended  NEUROLOGICAL EXAM:  EOMI, face symmetric, tongue is midline  Muscle Tone: Normal without spasticity or contractures in all extremities  Muscle Bulk: Normal and symmetric in all extremities  Gait: Normal      Bi ELVIS Parikh has resolution of traumatic SAH. We reviewed CT imaging at time of fall and from 01/16/2024 He is progressing well and may return PRN.

## 2024-02-07 ENCOUNTER — LAB (OUTPATIENT)
Dept: LAB | Facility: LAB | Age: 89
End: 2024-02-07
Payer: MEDICARE

## 2024-02-07 DIAGNOSIS — E55.9 VITAMIN D DEFICIENCY, UNSPECIFIED: ICD-10-CM

## 2024-02-07 DIAGNOSIS — E03.9 HYPOTHYROIDISM, UNSPECIFIED: Primary | ICD-10-CM

## 2024-02-07 LAB
25(OH)D3 SERPL-MCNC: 108 NG/ML (ref 30–100)
T3 SERPL-MCNC: 109 NG/DL (ref 60–200)
T4 SERPL-MCNC: 10.1 UG/DL (ref 4.5–11.1)
TSH SERPL-ACNC: 0.21 MIU/L (ref 0.44–3.98)

## 2024-02-07 PROCEDURE — 84442 ASSAY OF THYROID ACTIVITY: CPT

## 2024-02-07 PROCEDURE — 84443 ASSAY THYROID STIM HORMONE: CPT

## 2024-02-07 PROCEDURE — 84480 ASSAY TRIIODOTHYRONINE (T3): CPT

## 2024-02-07 PROCEDURE — 82306 VITAMIN D 25 HYDROXY: CPT

## 2024-02-07 PROCEDURE — 36415 COLL VENOUS BLD VENIPUNCTURE: CPT

## 2024-02-07 PROCEDURE — 84436 ASSAY OF TOTAL THYROXINE: CPT

## 2024-02-09 LAB — T4BG SERPL-MCNC: 15.4 UG/ML (ref 13–30)

## 2024-08-22 ENCOUNTER — LAB (OUTPATIENT)
Dept: LAB | Facility: LAB | Age: 89
End: 2024-08-22
Payer: MEDICARE

## 2024-08-22 DIAGNOSIS — C73 MALIGNANT NEOPLASM OF THYROID GLAND (MULTI): Primary | ICD-10-CM

## 2024-08-22 LAB
T3 SERPL-MCNC: 97 NG/DL (ref 60–200)
T4 FREE SERPL-MCNC: 1.08 NG/DL (ref 0.61–1.12)
T4 SERPL-MCNC: 8.6 UG/DL (ref 4.5–11.1)
TSH SERPL-ACNC: 0.18 MIU/L (ref 0.44–3.98)

## 2024-08-22 PROCEDURE — 84439 ASSAY OF FREE THYROXINE: CPT

## 2024-08-22 PROCEDURE — 84436 ASSAY OF TOTAL THYROXINE: CPT

## 2024-08-22 PROCEDURE — 84432 ASSAY OF THYROGLOBULIN: CPT

## 2024-08-22 PROCEDURE — 84443 ASSAY THYROID STIM HORMONE: CPT

## 2024-08-22 PROCEDURE — 84480 ASSAY TRIIODOTHYRONINE (T3): CPT

## 2024-08-24 LAB
BILL ONLY-THYROGLOBULIN: NORMAL
THYROGLOB AB SERPL-ACNC: <0.9 IU/ML (ref 0–4)
THYROGLOB SERPL-MCNC: <0.1 NG/ML (ref 1.3–31.8)
THYROGLOB SERPL-MCNC: ABNORMAL NG/ML (ref 1.3–31.8)

## 2024-08-30 ENCOUNTER — LAB (OUTPATIENT)
Dept: LAB | Facility: LAB | Age: 89
End: 2024-08-30
Payer: MEDICARE

## 2024-08-30 DIAGNOSIS — C73 MALIGNANT NEOPLASM OF THYROID GLAND (MULTI): ICD-10-CM

## 2024-08-30 DIAGNOSIS — E55.9 VITAMIN D DEFICIENCY, UNSPECIFIED: Primary | ICD-10-CM

## 2024-08-30 LAB
T4 FREE SERPL-MCNC: 1.11 NG/DL (ref 0.61–1.12)
TSH SERPL-ACNC: 0.18 MIU/L (ref 0.44–3.98)

## 2024-08-30 PROCEDURE — 84439 ASSAY OF FREE THYROXINE: CPT

## 2024-08-30 PROCEDURE — 84436 ASSAY OF TOTAL THYROXINE: CPT

## 2024-08-30 PROCEDURE — 84480 ASSAY TRIIODOTHYRONINE (T3): CPT

## 2024-08-30 PROCEDURE — 36415 COLL VENOUS BLD VENIPUNCTURE: CPT

## 2024-08-30 PROCEDURE — 84443 ASSAY THYROID STIM HORMONE: CPT

## 2024-08-30 PROCEDURE — 82306 VITAMIN D 25 HYDROXY: CPT

## 2024-08-31 LAB
25(OH)D3 SERPL-MCNC: 102 NG/ML (ref 30–100)
T3 SERPL-MCNC: 85 NG/DL (ref 60–200)
T4 SERPL-MCNC: 8 UG/DL (ref 4.5–11.1)